# Patient Record
Sex: MALE | Race: BLACK OR AFRICAN AMERICAN | NOT HISPANIC OR LATINO | URBAN - METROPOLITAN AREA
[De-identification: names, ages, dates, MRNs, and addresses within clinical notes are randomized per-mention and may not be internally consistent; named-entity substitution may affect disease eponyms.]

---

## 2017-06-24 ENCOUNTER — EMERGENCY (EMERGENCY)
Facility: HOSPITAL | Age: 82
LOS: 1 days | Discharge: HOME | End: 2017-06-24
Attending: EMERGENCY MEDICINE
Payer: MEDICARE

## 2017-06-24 VITALS
DIASTOLIC BLOOD PRESSURE: 67 MMHG | SYSTOLIC BLOOD PRESSURE: 174 MMHG | OXYGEN SATURATION: 97 % | TEMPERATURE: 99 F | HEIGHT: 67 IN | HEART RATE: 93 BPM | WEIGHT: 139.99 LBS | RESPIRATION RATE: 17 BRPM

## 2017-06-24 DIAGNOSIS — R10.9 UNSPECIFIED ABDOMINAL PAIN: ICD-10-CM

## 2017-06-24 DIAGNOSIS — M79.1 MYALGIA: ICD-10-CM

## 2017-06-24 DIAGNOSIS — E78.5 HYPERLIPIDEMIA, UNSPECIFIED: ICD-10-CM

## 2017-06-24 LAB
ACETONE SERPL-MCNC: NEGATIVE — SIGNIFICANT CHANGE UP
ALBUMIN SERPL ELPH-MCNC: 3.2 G/DL — LOW (ref 3.5–5)
ALP SERPL-CCNC: 97 U/L — SIGNIFICANT CHANGE UP (ref 40–120)
ALT FLD-CCNC: 27 U/L DA — SIGNIFICANT CHANGE UP (ref 10–60)
ANION GAP SERPL CALC-SCNC: 8 MMOL/L — SIGNIFICANT CHANGE UP (ref 5–17)
APPEARANCE UR: CLEAR — SIGNIFICANT CHANGE UP
APTT BLD: 31.2 SEC — SIGNIFICANT CHANGE UP (ref 27.5–37.4)
AST SERPL-CCNC: 28 U/L — SIGNIFICANT CHANGE UP (ref 10–40)
BACTERIA # UR AUTO: ABNORMAL /HPF
BASOPHILS # BLD AUTO: 0.2 K/UL — SIGNIFICANT CHANGE UP (ref 0–0.2)
BASOPHILS NFR BLD AUTO: 1.8 % — SIGNIFICANT CHANGE UP (ref 0–2)
BILIRUB SERPL-MCNC: 0.6 MG/DL — SIGNIFICANT CHANGE UP (ref 0.2–1.2)
BILIRUB UR-MCNC: NEGATIVE — SIGNIFICANT CHANGE UP
BUN SERPL-MCNC: 12 MG/DL — SIGNIFICANT CHANGE UP (ref 7–18)
CALCIUM SERPL-MCNC: 9.4 MG/DL — SIGNIFICANT CHANGE UP (ref 8.4–10.5)
CHLORIDE SERPL-SCNC: 109 MMOL/L — HIGH (ref 96–108)
CK MB BLD-MCNC: 0.2 % — SIGNIFICANT CHANGE UP (ref 0–3.5)
CK MB CFR SERPL CALC: 1.2 NG/ML — SIGNIFICANT CHANGE UP (ref 0–3.6)
CK SERPL-CCNC: 616 U/L — HIGH (ref 35–232)
CO2 SERPL-SCNC: 25 MMOL/L — SIGNIFICANT CHANGE UP (ref 22–31)
COLOR SPEC: YELLOW — SIGNIFICANT CHANGE UP
COMMENT - URINE: SIGNIFICANT CHANGE UP
CREAT SERPL-MCNC: 0.88 MG/DL — SIGNIFICANT CHANGE UP (ref 0.5–1.3)
DIFF PNL FLD: ABNORMAL
EOSINOPHIL # BLD AUTO: 0.7 K/UL — HIGH (ref 0–0.5)
EOSINOPHIL NFR BLD AUTO: 6 % — SIGNIFICANT CHANGE UP (ref 0–6)
EPI CELLS # UR: ABNORMAL (ref 0–10)
GLUCOSE SERPL-MCNC: 102 MG/DL — HIGH (ref 70–99)
GLUCOSE UR QL: NEGATIVE — SIGNIFICANT CHANGE UP
HCT VFR BLD CALC: 45.7 % — SIGNIFICANT CHANGE UP (ref 39–50)
HGB BLD-MCNC: 14.6 G/DL — SIGNIFICANT CHANGE UP (ref 13–17)
INR BLD: 1.03 RATIO — SIGNIFICANT CHANGE UP (ref 0.88–1.16)
KETONES UR-MCNC: NEGATIVE — SIGNIFICANT CHANGE UP
LEUKOCYTE ESTERASE UR-ACNC: ABNORMAL
LIDOCAIN IGE QN: 123 U/L — SIGNIFICANT CHANGE UP (ref 73–393)
LYMPHOCYTES # BLD AUTO: 29.8 % — SIGNIFICANT CHANGE UP (ref 13–44)
LYMPHOCYTES # BLD AUTO: 3.3 K/UL — SIGNIFICANT CHANGE UP (ref 1–3.3)
MAGNESIUM SERPL-MCNC: 2.2 MG/DL — SIGNIFICANT CHANGE UP (ref 1.6–2.6)
MCHC RBC-ENTMCNC: 30.3 PG — SIGNIFICANT CHANGE UP (ref 27–34)
MCHC RBC-ENTMCNC: 31.8 GM/DL — LOW (ref 32–36)
MCV RBC AUTO: 95 FL — SIGNIFICANT CHANGE UP (ref 80–100)
MONOCYTES # BLD AUTO: 1 K/UL — HIGH (ref 0–0.9)
MONOCYTES NFR BLD AUTO: 9.3 % — SIGNIFICANT CHANGE UP (ref 2–14)
NEUTROPHILS # BLD AUTO: 5.8 K/UL — SIGNIFICANT CHANGE UP (ref 1.8–7.4)
NEUTROPHILS NFR BLD AUTO: 53.2 % — SIGNIFICANT CHANGE UP (ref 43–77)
NITRITE UR-MCNC: NEGATIVE — SIGNIFICANT CHANGE UP
PH UR: 6 — SIGNIFICANT CHANGE UP (ref 5–8)
PLATELET # BLD AUTO: 303 K/UL — SIGNIFICANT CHANGE UP (ref 150–400)
POTASSIUM SERPL-MCNC: 4.1 MMOL/L — SIGNIFICANT CHANGE UP (ref 3.5–5.3)
POTASSIUM SERPL-SCNC: 4.1 MMOL/L — SIGNIFICANT CHANGE UP (ref 3.5–5.3)
PROT SERPL-MCNC: 7.7 G/DL — SIGNIFICANT CHANGE UP (ref 6–8.3)
PROT UR-MCNC: 30 MG/DL
PROTHROM AB SERPL-ACNC: 11.2 SEC — SIGNIFICANT CHANGE UP (ref 9.8–12.7)
RBC # BLD: 4.81 M/UL — SIGNIFICANT CHANGE UP (ref 4.2–5.8)
RBC # FLD: 11.9 % — SIGNIFICANT CHANGE UP (ref 10.3–14.5)
RBC CASTS # UR COMP ASSIST: SIGNIFICANT CHANGE UP /HPF (ref 0–2)
SODIUM SERPL-SCNC: 142 MMOL/L — SIGNIFICANT CHANGE UP (ref 135–145)
SP GR SPEC: 1.02 — SIGNIFICANT CHANGE UP (ref 1.01–1.02)
TROPONIN I SERPL-MCNC: <0.015 NG/ML — SIGNIFICANT CHANGE UP (ref 0–0.04)
UROBILINOGEN FLD QL: 1
WBC # BLD: 11 K/UL — HIGH (ref 3.8–10.5)
WBC # FLD AUTO: 11 K/UL — HIGH (ref 3.8–10.5)
WBC UR QL: SIGNIFICANT CHANGE UP /HPF (ref 0–5)

## 2017-06-24 PROCEDURE — 85610 PROTHROMBIN TIME: CPT

## 2017-06-24 PROCEDURE — 87086 URINE CULTURE/COLONY COUNT: CPT

## 2017-06-24 PROCEDURE — 96374 THER/PROPH/DIAG INJ IV PUSH: CPT

## 2017-06-24 PROCEDURE — 80053 COMPREHEN METABOLIC PANEL: CPT

## 2017-06-24 PROCEDURE — 83690 ASSAY OF LIPASE: CPT

## 2017-06-24 PROCEDURE — 99284 EMERGENCY DEPT VISIT MOD MDM: CPT | Mod: 25

## 2017-06-24 PROCEDURE — 82553 CREATINE MB FRACTION: CPT

## 2017-06-24 PROCEDURE — 82550 ASSAY OF CK (CPK): CPT

## 2017-06-24 PROCEDURE — 81001 URINALYSIS AUTO W/SCOPE: CPT

## 2017-06-24 PROCEDURE — 71010: CPT | Mod: 26

## 2017-06-24 PROCEDURE — 99284 EMERGENCY DEPT VISIT MOD MDM: CPT

## 2017-06-24 PROCEDURE — 84484 ASSAY OF TROPONIN QUANT: CPT

## 2017-06-24 PROCEDURE — 85027 COMPLETE CBC AUTOMATED: CPT

## 2017-06-24 PROCEDURE — 96375 TX/PRO/DX INJ NEW DRUG ADDON: CPT

## 2017-06-24 PROCEDURE — 36415 COLL VENOUS BLD VENIPUNCTURE: CPT

## 2017-06-24 PROCEDURE — 85730 THROMBOPLASTIN TIME PARTIAL: CPT

## 2017-06-24 PROCEDURE — 83735 ASSAY OF MAGNESIUM: CPT

## 2017-06-24 PROCEDURE — 82009 KETONE BODYS QUAL: CPT

## 2017-06-24 PROCEDURE — 71045 X-RAY EXAM CHEST 1 VIEW: CPT

## 2017-06-24 RX ORDER — KETOROLAC TROMETHAMINE 30 MG/ML
30 SYRINGE (ML) INJECTION ONCE
Qty: 0 | Refills: 0 | Status: DISCONTINUED | OUTPATIENT
Start: 2017-06-24 | End: 2017-06-24

## 2017-06-24 RX ORDER — SODIUM CHLORIDE 9 MG/ML
3 INJECTION INTRAMUSCULAR; INTRAVENOUS; SUBCUTANEOUS ONCE
Qty: 0 | Refills: 0 | Status: COMPLETED | OUTPATIENT
Start: 2017-06-24 | End: 2017-06-24

## 2017-06-24 RX ORDER — ONDANSETRON 8 MG/1
4 TABLET, FILM COATED ORAL ONCE
Qty: 0 | Refills: 0 | Status: COMPLETED | OUTPATIENT
Start: 2017-06-24 | End: 2017-06-24

## 2017-06-24 RX ORDER — FAMOTIDINE 10 MG/ML
20 INJECTION INTRAVENOUS ONCE
Qty: 0 | Refills: 0 | Status: COMPLETED | OUTPATIENT
Start: 2017-06-24 | End: 2017-06-24

## 2017-06-24 RX ADMIN — FAMOTIDINE 20 MILLIGRAM(S): 10 INJECTION INTRAVENOUS at 10:53

## 2017-06-24 RX ADMIN — ONDANSETRON 4 MILLIGRAM(S): 8 TABLET, FILM COATED ORAL at 10:53

## 2017-06-24 RX ADMIN — Medication 30 MILLIGRAM(S): at 11:53

## 2017-06-24 RX ADMIN — SODIUM CHLORIDE 3 MILLILITER(S): 9 INJECTION INTRAMUSCULAR; INTRAVENOUS; SUBCUTANEOUS at 10:52

## 2017-06-24 NOTE — ED PROVIDER NOTE - OBJECTIVE STATEMENT
80 y/o M pt w/ PMHx of HLD and prostate CA presents to the ED for R sided abd pain. Pt describes the pain as constant and worsens with movement. Pt says there is no pain at rest. Pt denies nausea, vomiting, diarrhea, dysuria, falling, or any other complaints. NKDA.

## 2017-06-24 NOTE — ED PROVIDER NOTE - MEDICAL DECISION MAKING DETAILS
80 y/o M pt presents to the ED for R sided lateral abdominal pain. Mostly muscular origin. Due to age, will get labs, CXR to r/o PNA, and reassess.

## 2017-06-25 LAB
CULTURE RESULTS: NO GROWTH — SIGNIFICANT CHANGE UP
SPECIMEN SOURCE: SIGNIFICANT CHANGE UP

## 2021-09-05 ENCOUNTER — INPATIENT (INPATIENT)
Facility: HOSPITAL | Age: 86
LOS: 3 days | Discharge: HOME | DRG: 64 | End: 2021-09-09
Attending: HOSPITALIST | Admitting: HOSPITALIST
Payer: MEDICARE

## 2021-09-05 VITALS
DIASTOLIC BLOOD PRESSURE: 74 MMHG | WEIGHT: 134.92 LBS | HEIGHT: 67 IN | RESPIRATION RATE: 17 BRPM | TEMPERATURE: 98 F | OXYGEN SATURATION: 97 % | SYSTOLIC BLOOD PRESSURE: 151 MMHG | HEART RATE: 68 BPM

## 2021-09-05 LAB
ALBUMIN SERPL ELPH-MCNC: 3.8 G/DL — SIGNIFICANT CHANGE UP (ref 3.5–5)
ALP SERPL-CCNC: 90 U/L — SIGNIFICANT CHANGE UP (ref 40–120)
ALT FLD-CCNC: 24 U/L DA — SIGNIFICANT CHANGE UP (ref 10–60)
ANION GAP SERPL CALC-SCNC: 5 MMOL/L — SIGNIFICANT CHANGE UP (ref 5–17)
AST SERPL-CCNC: 22 U/L — SIGNIFICANT CHANGE UP (ref 10–40)
BASOPHILS # BLD AUTO: 0.08 K/UL — SIGNIFICANT CHANGE UP (ref 0–0.2)
BASOPHILS NFR BLD AUTO: 0.9 % — SIGNIFICANT CHANGE UP (ref 0–2)
BILIRUB SERPL-MCNC: 0.4 MG/DL — SIGNIFICANT CHANGE UP (ref 0.2–1.2)
BUN SERPL-MCNC: 9 MG/DL — SIGNIFICANT CHANGE UP (ref 7–18)
CALCIUM SERPL-MCNC: 9.4 MG/DL — SIGNIFICANT CHANGE UP (ref 8.4–10.5)
CHLORIDE SERPL-SCNC: 105 MMOL/L — SIGNIFICANT CHANGE UP (ref 96–108)
CO2 SERPL-SCNC: 28 MMOL/L — SIGNIFICANT CHANGE UP (ref 22–31)
CREAT SERPL-MCNC: 0.65 MG/DL — SIGNIFICANT CHANGE UP (ref 0.5–1.3)
EOSINOPHIL # BLD AUTO: 0.36 K/UL — SIGNIFICANT CHANGE UP (ref 0–0.5)
EOSINOPHIL NFR BLD AUTO: 3.9 % — SIGNIFICANT CHANGE UP (ref 0–6)
GLUCOSE SERPL-MCNC: 93 MG/DL — SIGNIFICANT CHANGE UP (ref 70–99)
HCT VFR BLD CALC: 42.7 % — SIGNIFICANT CHANGE UP (ref 39–50)
HGB BLD-MCNC: 13.8 G/DL — SIGNIFICANT CHANGE UP (ref 13–17)
IMM GRANULOCYTES NFR BLD AUTO: 0.3 % — SIGNIFICANT CHANGE UP (ref 0–1.5)
LYMPHOCYTES # BLD AUTO: 3.22 K/UL — SIGNIFICANT CHANGE UP (ref 1–3.3)
LYMPHOCYTES # BLD AUTO: 35.1 % — SIGNIFICANT CHANGE UP (ref 13–44)
MCHC RBC-ENTMCNC: 29.8 PG — SIGNIFICANT CHANGE UP (ref 27–34)
MCHC RBC-ENTMCNC: 32.3 GM/DL — SIGNIFICANT CHANGE UP (ref 32–36)
MCV RBC AUTO: 92.2 FL — SIGNIFICANT CHANGE UP (ref 80–100)
MONOCYTES # BLD AUTO: 0.85 K/UL — SIGNIFICANT CHANGE UP (ref 0–0.9)
MONOCYTES NFR BLD AUTO: 9.3 % — SIGNIFICANT CHANGE UP (ref 2–14)
NEUTROPHILS # BLD AUTO: 4.63 K/UL — SIGNIFICANT CHANGE UP (ref 1.8–7.4)
NEUTROPHILS NFR BLD AUTO: 50.5 % — SIGNIFICANT CHANGE UP (ref 43–77)
NRBC # BLD: 0 /100 WBCS — SIGNIFICANT CHANGE UP (ref 0–0)
PLATELET # BLD AUTO: 340 K/UL — SIGNIFICANT CHANGE UP (ref 150–400)
POTASSIUM SERPL-MCNC: 4.2 MMOL/L — SIGNIFICANT CHANGE UP (ref 3.5–5.3)
POTASSIUM SERPL-SCNC: 4.2 MMOL/L — SIGNIFICANT CHANGE UP (ref 3.5–5.3)
PROT SERPL-MCNC: 7.3 G/DL — SIGNIFICANT CHANGE UP (ref 6–8.3)
RBC # BLD: 4.63 M/UL — SIGNIFICANT CHANGE UP (ref 4.2–5.8)
RBC # FLD: 12.8 % — SIGNIFICANT CHANGE UP (ref 10.3–14.5)
SODIUM SERPL-SCNC: 138 MMOL/L — SIGNIFICANT CHANGE UP (ref 135–145)
TROPONIN I SERPL-MCNC: <0.015 NG/ML — SIGNIFICANT CHANGE UP (ref 0–0.04)
WBC # BLD: 9.17 K/UL — SIGNIFICANT CHANGE UP (ref 3.8–10.5)
WBC # FLD AUTO: 9.17 K/UL — SIGNIFICANT CHANGE UP (ref 3.8–10.5)

## 2021-09-05 PROCEDURE — 70450 CT HEAD/BRAIN W/O DYE: CPT | Mod: 26,MA

## 2021-09-05 PROCEDURE — 73030 X-RAY EXAM OF SHOULDER: CPT | Mod: 26,LT

## 2021-09-05 PROCEDURE — 99285 EMERGENCY DEPT VISIT HI MDM: CPT

## 2021-09-05 NOTE — ED ADULT NURSE NOTE - CHIEF COMPLAINT QUOTE
Bought in by daughter (Lv Potter [phone]) pt landed at 6pm at [facility] from a flight from [facility]. He was Diagnosed with "mini strokes" in [facility] because he has been decorticating 3 days ago.  In triage, pt is aaox4, ambulatory and complaining of left shoulder pain.  He denies recollection of the events his daughter speaks of.  He voiced left shoulder and left hip pain.  He has h/o HTN only, as per daughter

## 2021-09-05 NOTE — ED ADULT NURSE NOTE - NSIMPLEMENTINTERV_GEN_ALL_ED
Implemented All Universal Safety Interventions:  [place] to call system. Call bell, personal items and telephone within reach. Instruct patient to call for assistance. Room bathroom lighting operational. Non-slip footwear when patient is off stretcher. Physically safe environment: no spills, clutter or unnecessary equipment. Stretcher in lowest position, wheels locked, appropriate side rails in place.

## 2021-09-05 NOTE — ED ADULT NURSE NOTE - OBJECTIVE STATEMENT
as per daughter came back from [facility],4 days ago pt diagnosed with mini stroke with complaing of left shoulder  and hip pain

## 2021-09-06 DIAGNOSIS — E78.5 HYPERLIPIDEMIA, UNSPECIFIED: ICD-10-CM

## 2021-09-06 DIAGNOSIS — Z29.9 ENCOUNTER FOR PROPHYLACTIC MEASURES, UNSPECIFIED: ICD-10-CM

## 2021-09-06 DIAGNOSIS — I63.9 CEREBRAL INFARCTION, UNSPECIFIED: ICD-10-CM

## 2021-09-06 DIAGNOSIS — I63.81 OTHER CEREBRAL INFARCTION DUE TO OCCLUSION OR STENOSIS OF SMALL ARTERY: ICD-10-CM

## 2021-09-06 DIAGNOSIS — I10 ESSENTIAL (PRIMARY) HYPERTENSION: ICD-10-CM

## 2021-09-06 LAB
24R-OH-CALCIDIOL SERPL-MCNC: 43.1 NG/ML — SIGNIFICANT CHANGE UP (ref 30–80)
A1C WITH ESTIMATED AVERAGE GLUCOSE RESULT: 5.8 % — HIGH (ref 4–5.6)
ALBUMIN SERPL ELPH-MCNC: 3.5 G/DL — SIGNIFICANT CHANGE UP (ref 3.5–5)
ALP SERPL-CCNC: 86 U/L — SIGNIFICANT CHANGE UP (ref 40–120)
ALT FLD-CCNC: 24 U/L DA — SIGNIFICANT CHANGE UP (ref 10–60)
ANION GAP SERPL CALC-SCNC: 5 MMOL/L — SIGNIFICANT CHANGE UP (ref 5–17)
AST SERPL-CCNC: 22 U/L — SIGNIFICANT CHANGE UP (ref 10–40)
BILIRUB SERPL-MCNC: 0.6 MG/DL — SIGNIFICANT CHANGE UP (ref 0.2–1.2)
BUN SERPL-MCNC: 8 MG/DL — SIGNIFICANT CHANGE UP (ref 7–18)
CALCIUM SERPL-MCNC: 9.2 MG/DL — SIGNIFICANT CHANGE UP (ref 8.4–10.5)
CHLORIDE SERPL-SCNC: 106 MMOL/L — SIGNIFICANT CHANGE UP (ref 96–108)
CHOLEST SERPL-MCNC: 159 MG/DL — SIGNIFICANT CHANGE UP
CO2 SERPL-SCNC: 27 MMOL/L — SIGNIFICANT CHANGE UP (ref 22–31)
CREAT SERPL-MCNC: 0.64 MG/DL — SIGNIFICANT CHANGE UP (ref 0.5–1.3)
ESTIMATED AVERAGE GLUCOSE: 120 MG/DL — HIGH (ref 68–114)
GLUCOSE SERPL-MCNC: 84 MG/DL — SIGNIFICANT CHANGE UP (ref 70–99)
HCT VFR BLD CALC: 42.9 % — SIGNIFICANT CHANGE UP (ref 39–50)
HDLC SERPL-MCNC: 52 MG/DL — SIGNIFICANT CHANGE UP
HGB BLD-MCNC: 14.1 G/DL — SIGNIFICANT CHANGE UP (ref 13–17)
INR BLD: 1.02 RATIO — SIGNIFICANT CHANGE UP (ref 0.88–1.16)
LIPID PNL WITH DIRECT LDL SERPL: 91 MG/DL — SIGNIFICANT CHANGE UP
MAGNESIUM SERPL-MCNC: 2.3 MG/DL — SIGNIFICANT CHANGE UP (ref 1.6–2.6)
MCHC RBC-ENTMCNC: 30 PG — SIGNIFICANT CHANGE UP (ref 27–34)
MCHC RBC-ENTMCNC: 32.9 GM/DL — SIGNIFICANT CHANGE UP (ref 32–36)
MCV RBC AUTO: 91.3 FL — SIGNIFICANT CHANGE UP (ref 80–100)
NON HDL CHOLESTEROL: 107 MG/DL — SIGNIFICANT CHANGE UP
NRBC # BLD: 0 /100 WBCS — SIGNIFICANT CHANGE UP (ref 0–0)
PHOSPHATE SERPL-MCNC: 2.9 MG/DL — SIGNIFICANT CHANGE UP (ref 2.5–4.5)
PLATELET # BLD AUTO: 326 K/UL — SIGNIFICANT CHANGE UP (ref 150–400)
POTASSIUM SERPL-MCNC: 3.9 MMOL/L — SIGNIFICANT CHANGE UP (ref 3.5–5.3)
POTASSIUM SERPL-SCNC: 3.9 MMOL/L — SIGNIFICANT CHANGE UP (ref 3.5–5.3)
PROT SERPL-MCNC: 7.1 G/DL — SIGNIFICANT CHANGE UP (ref 6–8.3)
PROTHROM AB SERPL-ACNC: 12.1 SEC — SIGNIFICANT CHANGE UP (ref 10.6–13.6)
RBC # BLD: 4.7 M/UL — SIGNIFICANT CHANGE UP (ref 4.2–5.8)
RBC # FLD: 12.9 % — SIGNIFICANT CHANGE UP (ref 10.3–14.5)
SARS-COV-2 RNA SPEC QL NAA+PROBE: SIGNIFICANT CHANGE UP
SODIUM SERPL-SCNC: 138 MMOL/L — SIGNIFICANT CHANGE UP (ref 135–145)
TRIGL SERPL-MCNC: 79 MG/DL — SIGNIFICANT CHANGE UP
TROPONIN I SERPL-MCNC: <0.015 NG/ML — SIGNIFICANT CHANGE UP (ref 0–0.04)
WBC # BLD: 7.4 K/UL — SIGNIFICANT CHANGE UP (ref 3.8–10.5)
WBC # FLD AUTO: 7.4 K/UL — SIGNIFICANT CHANGE UP (ref 3.8–10.5)

## 2021-09-06 PROCEDURE — 99223 1ST HOSP IP/OBS HIGH 75: CPT

## 2021-09-06 PROCEDURE — 99223 1ST HOSP IP/OBS HIGH 75: CPT | Mod: GC

## 2021-09-06 PROCEDURE — 71045 X-RAY EXAM CHEST 1 VIEW: CPT | Mod: 26

## 2021-09-06 RX ORDER — ATORVASTATIN CALCIUM 80 MG/1
40 TABLET, FILM COATED ORAL AT BEDTIME
Refills: 0 | Status: DISCONTINUED | OUTPATIENT
Start: 2021-09-06 | End: 2021-09-09

## 2021-09-06 RX ORDER — ENOXAPARIN SODIUM 100 MG/ML
40 INJECTION SUBCUTANEOUS DAILY
Refills: 0 | Status: DISCONTINUED | OUTPATIENT
Start: 2021-09-06 | End: 2021-09-06

## 2021-09-06 RX ORDER — INFLUENZA VIRUS VACCINE 15; 15; 15; 15 UG/.5ML; UG/.5ML; UG/.5ML; UG/.5ML
0.5 SUSPENSION INTRAMUSCULAR ONCE
Refills: 0 | Status: DISCONTINUED | OUTPATIENT
Start: 2021-09-06 | End: 2021-09-09

## 2021-09-06 RX ORDER — ASPIRIN/CALCIUM CARB/MAGNESIUM 324 MG
81 TABLET ORAL DAILY
Refills: 0 | Status: DISCONTINUED | OUTPATIENT
Start: 2021-09-06 | End: 2021-09-06

## 2021-09-06 RX ADMIN — ATORVASTATIN CALCIUM 40 MILLIGRAM(S): 80 TABLET, FILM COATED ORAL at 21:44

## 2021-09-06 NOTE — ED PROVIDER NOTE - CLINICAL SUMMARY MEDICAL DECISION MAKING FREE TEXT BOX
87 y/o man, h/o HTN, noncompliant with medication, just flew in from [facility] and presents here accompanied by daughter, reporting that about 1 week ago he had weakness and numbness to LLE, difficulty walking, which lasted about 1-2 days and resolved spontaneously--CT head, labs, EKG, admission.

## 2021-09-06 NOTE — H&P ADULT - NSHPSOCIALHISTORY_GEN_ALL_CORE
Active smoker, Smokes around 10 cigs per day,   Drinks Alcohol occasionally, last drink last Friday (1 week ago), used to be a heavy drinker   No Drugs

## 2021-09-06 NOTE — PATIENT PROFILE ADULT - NSPROIMPLANTSMEDDEV_GEN_A_NUR
Patient would like communication of their results via:    Home Phone: [phone] (home)  Okay to leave a message containing results? Yes     Health Maintenance Due   Topic Date Due   • Colorectal Cancer Screening-Colonoscopy  02/03/2004   • Shingles Vaccine (1 of 2) 02/03/2004   • Hepatitis C Screening  02/03/2005   • Breast Cancer Screening  02/03/2009   • Osteoporosis Screening  02/03/2019   • Medicare Wellness 65+  02/03/2019   • Pneumococcal Vaccine 65+ (1 of 2 - PCV13) 02/03/2019                              None

## 2021-09-06 NOTE — H&P ADULT - NSHPPHYSICALEXAM_GEN_ALL_CORE
PHYSICAL EXAM:  GENERAL: NAD, speaks in full sentences, no signs of respiratory distress  HEAD:  Atraumatic, Normocephalic  EYES: EOMI, PERRLA, conjunctiva and sclera clear  NECK: Supple, No JVD  CHEST/LUNG: Clear to auscultation bilaterally; No wheeze; No crackles; No accessory muscles used  HEART: Regular rate and rhythm; No murmurs;   ABDOMEN: Soft, Nontender, Nondistended; Bowel sounds present; No guarding  EXTREMITIES:  2+ Peripheral Pulses, No cyanosis or edema  PSYCH: AAOx3  NEUROLOGY: Power 5/5 in B/L upper and lower extremity, CN intact II-XII, Sensations intact, plantars down  SKIN: No rashes or lesions

## 2021-09-06 NOTE — CHART NOTE - NSCHARTNOTEFT_GEN_A_CORE
Spoke to pt's daughter Genadel Labidou at bedside, updated on clinical status treatment plan/options all questions answered   Home medication list reviewed with pt's daughter, pt is only on Aspirin 81mg daily at home, admission med rec updated

## 2021-09-06 NOTE — H&P ADULT - HISTORY OF PRESENT ILLNESS
87 y/o man Creol speaking male, with h/o HTN, noncompliant with medication, just flew in from [facility] and presents here accompanied by daughter, reporting that about 1 week ago he had weakness and numbness to left side, difficulty walking, which lasted about 1-2 days and resolved spontaneously.  He was seen at a medical clinic in [facility] and was diagnosed to have a possible Stroke or TIA, however ,no imaging or treatment was performed. According to patient's daughter, he is back to baseline, no more weakness is present, however, complains of slow gait and needs assistance to walk.   Patient denies speech, visual, or mental status change, spasms, weakness, chest pain, headaches, diarrhea, constipation.,     FULL CODE  ED course:  Vital Signs Last 24 Hrs  T(C): 36.9 (06 Sep 2021 00:32), Max: 36.9 (06 Sep 2021 00:32)  T(F): 98.5 (06 Sep 2021 00:32), Max: 98.5 (06 Sep 2021 00:32)  HR: 72 (06 Sep 2021 00:32) (68 - 72)  BP: 152/69 (06 Sep 2021 00:32) (151/74 - 152/69)  RR: 18 (06 Sep 2021 00:32) (17 - 18)  SpO2: 97% (06 Sep 2021 00:32) (97% - 97%)  EKG: NSR with non specific T wave changes

## 2021-09-06 NOTE — H&P ADULT - PROBLEM SELECTOR PLAN 4
RISK                                                          Points  [] Previous VTE                                           3  [] Thrombophilia                                        2  [] Lower limb paralysis                              2   [] Current Cancer                                       2   [x] Immobilization > 24 hrs                        1  [] ICU/CCU stay > 24 hours                       1  [x] Age > 60                                                   1    Will hold chemical prophylaxis as pt have micro-hemorrhages on CT scan, will advance if cleared by neuro   SCD boots for now

## 2021-09-06 NOTE — ED PROVIDER NOTE - OBJECTIVE STATEMENT
87 y/o man, h/o HTN, noncompliant with medication, just flew in from [facility] and presents here accompanied by daughter, reporting that about 1 week ago he had weakness and numbness to LLE, difficulty walking, which lasted about 1-2 days and resolved spontaneously.  He was seen at a medical clinic in [facility] but they say no imaging or treatment was performed.  He has not had any symptoms since then.  He also felt brief spasms of LUE  several days ago, which have resolved.  No speech or mental status change.  Does not take any anticoagulation or antiplatelets.

## 2021-09-06 NOTE — H&P ADULT - ATTENDING COMMENTS
Vital Signs Last 24 Hrs  T(C): 36.9 (06 Sep 2021 00:32), Max: 36.9 (06 Sep 2021 00:32)  T(F): 98.5 (06 Sep 2021 00:32), Max: 98.5 (06 Sep 2021 00:32)  HR: 72 (06 Sep 2021 00:32) (68 - 72)  BP: 152/69 (06 Sep 2021 00:32) (151/74 - 152/69)  BP(mean): --  RR: 18 (06 Sep 2021 00:32) (17 - 18)  SpO2: 97% (06 Sep 2021 00:32) (97% - 97%)    Physical exam as above    Labs and imaging studies noted    Assessment and plan: Patient is a 87 y/o man Creol speaking male, from home, independent at baseline, ambulatory with PMH HTN, noncompliant with medications, just flew in from [facility] and presents here accompanied by daughter, reporting that about 1 week ago he had weakness and numbness to left side, difficulty walking, which lasted about 1-2 days and resolved spontaneously.  He was seen at a medical clinic in [facility] and was diagnosed to have a possible Stroke or TIA, however ,no imaging or treatment was performed. According to patient's daughter, he is back to baseline, no more weakness is present, however, complains of slow gait and needs assistance to walk.   Patient denies speech, visual, or mental status change, spasms, weakness, chest pain, headaches, diarrhea, constipation.,       Vital Signs Last 24 Hrs  T(C): 36.9 (06 Sep 2021 00:32), Max: 36.9 (06 Sep 2021 00:32)  T(F): 98.5 (06 Sep 2021 00:32), Max: 98.5 (06 Sep 2021 00:32)  HR: 72 (06 Sep 2021 00:32) (68 - 72)  BP: 152/69 (06 Sep 2021 00:32) (151/74 - 152/69)  BP(mean): --  RR: 18 (06 Sep 2021 00:32) (17 - 18)  SpO2: 97% (06 Sep 2021 00:32) (97% - 97%)    Physical exam as above    Labs and imaging studies noted    Assessment and plan: Patient is a 85 yo male Creol speaking male, from home, independent at baseline, ambulatory at baseline with PMH of HTN, noncompliant with medications, presented with c/o difficulty walking, that started about 1 week ago when patient was in [facility]. Patient developed left lower extremity weakness 1 week ago, went to clinic in [facility], reportedly no imagining was done, not started on any medications. Patient reports that weakness improved spontaneously but still has some difficulty walking and requiring assistance with walking. Daughter reports some slurring on initial presentation but that has resolved. Denies any visual changes, lightheadedness, chest pain. patient not taking Aspirin at home for awhile .    In ED patient AAO x 3, /74, HR 68.  CT head w/o contrast reported as "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 Mild mass effect upon the frontal horn of the right lateral ventricle."  EKG NSR    Vital Signs Last 24 Hrs  T(C): 36.9 (06 Sep 2021 00:32), Max: 36.9 (06 Sep 2021 00:32)  T(F): 98.5 (06 Sep 2021 00:32), Max: 98.5 (06 Sep 2021 00:32)  HR: 72 (06 Sep 2021 00:32) (68 - 72)  BP: 152/69 (06 Sep 2021 00:32) (151/74 - 152/69)  BP(mean): --  RR: 18 (06 Sep 2021 00:32) (17 - 18)  SpO2: 97% (06 Sep 2021 00:32) (97% - 97%)    Physical exam as above    Labs and imaging studies noted    Assessment and plan:  Patient is a 85 yo male Creol speaking male, from home, independent at baseline, ambulatory at baseline with PMH of HTN, noncompliant with medications, presented with c/o difficulty walking/ weakness started 1 week ago admitted for management and evaluation of ischemic CVA.    CVA: LLE weakness started 1 week ago, improved to  most extent except still having some residual difficulty walking.  h/o HTN, current tobacco use. NIHSS 0  Admit to telemetry unit  ED physician spoke to neuro surgery on call , no acute intervention  Given finding of petechial hemorrhage noted on CT, and symptoms going on for 1 week, will hold for Aspirin, for now.   Neuro consult, for MRI/MRA and decision for timing of starting antiplatelet agent.  will start on HI statin. check lipid panel, a1c  will hold off antihypertensives, repaet BP wnl, monitor vitals q6h  systolic murmur grade 2 noted on 2 nd left IC space; ECHO ordered  Fall precaution/ aspiration precautions, neuro checks  PT eval    HTN: as above  HLD: as above  Tobacco abuse: nicotine patch, SW consult  DVT ppx: SCD for now

## 2021-09-06 NOTE — ED PROVIDER NOTE - NEUROLOGICAL, MLM
Alert and oriented, no focal deficits, no motor or sensory deficits.  Normal speech/coordination, no facial droop.

## 2021-09-06 NOTE — CHART NOTE - NSCHARTNOTEFT_GEN_A_CORE
MEDICAL ATTENDING NOTE  Patient is a 86y old  Male who presents with a chief complaint of TIA/Stroke (06 Sep 2021 02:07)--Several episodes of weakness and numbness of the LUE, which have resolved.       HPI:  87 y/o man Creol speaking male, with h/o HTN, noncompliant with medication, just flew in from [facility] and presents here accompanied by daughter, reporting that about 1 week ago he had weakness and numbness to left side, difficulty walking, which lasted about 1-2 days and resolved spontaneously.  He was seen at a medical clinic in [facility] and was diagnosed to have a possible Stroke or TIA, however ,no imaging or treatment was performed. According to patient's daughter, he is back to baseline, no more weakness is present, however, complains of slow gait and needs assistance to walk.   Patient denies speech, visual, or mental status change, spasms, weakness, chest pain, headaches, diarrhea, constipation.,     FULL CODE      INTERVAL HPI/OVERNIGHT EVENTS: no new complaints    MEDICATIONS  (STANDING):  atorvastatin 40 milliGRAM(s) Oral at bedtime  influenza   Vaccine 0.5 milliLiter(s) IntraMuscular once    MEDICATIONS  (PRN):      __________________________________________________  REVIEW OF SYSTEMS:    CONSTITUTIONAL: No fever,   EYES: no acute visual disturbances  NECK: No pain or stiffness  RESPIRATORY: No cough; No shortness of breath  CARDIOVASCULAR: No chest pain, no palpitations  GASTROINTESTINAL: No pain. No nausea or vomiting; No diarrhea   NEUROLOGICAL: No headache or numbness, no tremors  MUSCULOSKELETAL: No joint pain, no muscle pain  GENITOURINARY: no dysuria, no frequency, no hesitancy  PSYCHIATRY: no depression , no anxiety  ALL OTHER  ROS negative        Vital Signs Last 24 Hrs  T(C): 36.4 (06 Sep 2021 15:44), Max: 36.9 (06 Sep 2021 00:32)  T(F): 97.5 (06 Sep 2021 15:44), Max: 98.5 (06 Sep 2021 00:32)  HR: 66 (06 Sep 2021 15:44) (66 - 82)  BP: 101/65 (06 Sep 2021 15:44) (101/65 - 152/69)  BP(mean): --  RR: 17 (06 Sep 2021 15:44) (16 - 18)  SpO2: 96% (06 Sep 2021 15:44) (95% - 100%)    ________________________________________________  PHYSICAL EXAM:  GENERAL: NAD  HEENT: Normocephalic;  conjunctivae and sclerae clear; moist mucous membranes;   NECK : supple  CHEST/LUNG: Clear to auscultation bilaterally with good air entry   HEART: S1 S2  regular; no murmurs, gallops or rubs  ABDOMEN: Soft, Nontender, Nondistended; Bowel sounds present  EXTREMITIES: no cyanosis; no edema; no calf tenderness  SKIN: warm and dry; no rash  NERVOUS SYSTEM:  Awake and alert; Oriented  to place, person and time ; no new deficits    _________________________________________________  LABS:                        14.1   7.40  )-----------( 326      ( 06 Sep 2021 05:35 )             42.9     09-06    138  |  106  |  8   ----------------------------<  84  3.9   |  27  |  0.64    Ca    9.2      06 Sep 2021 05:35  Phos  2.9     09-06  Mg     2.3     09-06    TPro  7.1  /  Alb  3.5  /  TBili  0.6  /  DBili  x   /  AST  22  /  ALT  24  /  AlkPhos  86  09-06    PT/INR - ( 06 Sep 2021 05:35 )   PT: 12.1 sec;   INR: 1.02 ratio      < from: Xray Chest 1 View AP/PA (09.06.21 @ 00:47) >    AP chest.    Normal heart size. Atherosclerotic aorta. Coronary artery stent. No consolidation or effusion.    IMPRESSION: Noactive disease    < end of copied text >    < from: CT Head No Cont (09.05.21 @ 23:26) >      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 Mild mass effect upon the frontal horn of the right lateral ventricle.    < end of copied text >      IMP:  TIA, intermittent left numbness and hemiparesis, which has resolved.  CT shows infarct, small petechiae, and mild mass effect.  CT is c/w infarct with hemorrhage, but clinical course is also          c/w mass effect.            HTN, BP is now controlled without medication            Evidence of ASCVD, s/p coronary stent.   Plan:  High dose statin.  Hold ASA.   Neurology consultation, Dr. Doty will see.           MRI to exclude mass, if Neurology agrees.        Daughter is present at the bedside.

## 2021-09-06 NOTE — ED PROVIDER NOTE - INTERNATIONAL TRAVEL COUNTRIES
Medical Week 3 Survey      Responses   [facility] [place], operated by [facility] patient discharged from?  [facility]   Does the patient have one of the following disease processes/diagnoses(primary or secondary)?  Other   Week 3 attempt successful?  No   Unsuccessful attempts  Attempt 2          Brandon White RN   [place]

## 2021-09-06 NOTE — CONSULT NOTE ADULT - TIME BILLING
I counseled the patient about the testing and treatment indicated for stroke workup and secondary stroke prevention.

## 2021-09-06 NOTE — CONSULT NOTE ADULT - SUBJECTIVE AND OBJECTIVE BOX
NEUROLOGY CONSULT NOTE    NAME:  AMELIA LUO      ASSESSMENT:        RECOMMENDATIONS:        NOTE TO BE COMPLETED - PLEASE REFER TO ABOVE ONLY AND IGNORE INFORMATION BELOW    *******************************      CHIEF COMPLAINT:  Patient is a 86y old  Male who presents with a chief complaint of TIA/Stroke (06 Sep 2021 02:07)      HPI:  87 y/o man Creol speaking male, with h/o HTN, noncompliant with medication, just flew in from [facility] and presents here accompanied by daughter, reporting that about 1 week ago he had weakness and numbness to left side, difficulty walking, which lasted about 1-2 days and resolved spontaneously.  He was seen at a medical clinic in [facility] and was diagnosed to have a possible Stroke or TIA, however ,no imaging or treatment was performed. According to patient's daughter, he is back to baseline, no more weakness is present, however, complains of slow gait and needs assistance to walk.   Patient denies speech, visual, or mental status change, spasms, weakness, chest pain, headaches, diarrhea, constipation.,     FULL CODE  ED course:  Vital Signs Last 24 Hrs  T(C): 36.9 (06 Sep 2021 00:32), Max: 36.9 (06 Sep 2021 00:32)  T(F): 98.5 (06 Sep 2021 00:32), Max: 98.5 (06 Sep 2021 00:32)  HR: 72 (06 Sep 2021 00:32) (68 - 72)  BP: 152/69 (06 Sep 2021 00:32) (151/74 - 152/69)  RR: 18 (06 Sep 2021 00:32) (17 - 18)  SpO2: 97% (06 Sep 2021 00:32) (97% - 97%)  EKG: NSR with non specific T wave changes    (06 Sep 2021 02:07)      NEURO HPI:      PAST MEDICAL & SURGICAL HISTORY:  Hypercholesterolemia    Hypertension    No significant past surgical history        MEDICATIONS:  atorvastatin 40 milliGRAM(s) Oral at bedtime  influenza   Vaccine 0.5 milliLiter(s) IntraMuscular once      ALLERGIES:  No Known Allergies      FAMILY HISTORY:        SOCIAL HISTORY:  Denies alcohol, tobacco, or illicit drug use    REVIEW OF SYSTEMS:  GENERAL: No fever, weight changes, fatigue  EYES: No eye pain or discharge  EAR/NOSE/MOUTH/THROAT: No sinus or throat pain; No difficulty hearing  NECK: No pain or stiffness  RESPIRATORY: No cough, wheezing, chills, or hemoptysis  CARDIOVASCULAR: No chest pain, palpitations, shortness of breath, or dyspnea on exertion  GASTROINTESTINAL: No abdominal pain, nausea, vomiting, hematemesis, diarrhea, or constipation  GENITOURINARY: No dysuria, frequency, hematuria, or incontinence  SKIN: No rashes or lesions  ENDOCRINE: No heat or cold intolerance  HEMATOLOGIC: No easy bruising or bleeding  PSYCHIATRIC: No depression, anxiety, or mood swings  MUSCULOSKELETAL: No joint pain or swelling  NEUROLOGICAL: As per HPI        OBJECTIVE:    Vital Signs Last 24 Hrs  T(C): 36.9 (06 Sep 2021 20:08), Max: 36.9 (06 Sep 2021 00:32)  T(F): 98.4 (06 Sep 2021 20:08), Max: 98.5 (06 Sep 2021 00:32)  HR: 66 (06 Sep 2021 20:08) (66 - 82)  BP: 108/64 (06 Sep 2021 20:08) (101/65 - 152/69)  BP(mean): --  RR: 17 (06 Sep 2021 20:08) (16 - 18)  SpO2: 98% (06 Sep 2021 20:08) (95% - 100%)    General Examination:  General: No acute distress  HEENT: Atraumatic, Normocephalic  Respiratory: CTA B/l.  No crackles, rhonchi, or wheezes.  Cardiovascular: RRR.  Normal S1 & S2.  Normal b/l radial and pedal pulses.    Neurological Examination:  General / Mental Status: AAO x 3.  No aphasia or dysarthria.  Naming and repetition intact.  Cranial Nerves: VFF x 4.  PERRL.  EOMI x 2, No nystagmus or diplopia.  B/l V1-V3 equal and intact to light touch and pinprick.  Symmetric facial movement and palate elevation.  B/l hearing equal to finger rub.  5/5 strength with b/l sternocleidomastoid & trapezius.  Midline tongue protrusion, with no atrophy or fasciculations.  Motor: Normal bulk & tone in all four extremities.  5/5 strength throughout all four extremities.  No downward drift, rigidity, spasticity, or tremors in any of the four extremities.  Sensory: Intact to light touch and pinprick in all four extremities.  Negative Romberg.  Reflex: 2+ and symmetric at b/l biceps, triceps, brachioradialis, patellae, and ankles.  Downgoing toes b/l.  Coordination: No dysmetria with b/l finger-to-nose and heel raise tests.  Symmetric rapid alternating movements b/l.  Gait: Normal, narrow-based gait.  No difficulty with tiptoe, heel, and tandem gaits.        LABORATORY VALUES:                        14.1   7.40  )-----------( 326      ( 06 Sep 2021 05:35 )             42.9       09-06    138  |  106  |  8   ----------------------------<  84  3.9   |  27  |  0.64    Ca    9.2      06 Sep 2021 05:35  Phos  2.9     09-06  Mg     2.3     09-06    TPro  7.1  /  Alb  3.5  /  TBili  0.6  /  DBili  x   /  AST  22  /  ALT  24  /  AlkPhos  86  09-06 09-06 Chol 159 LDL 96 HDL 52 Trig 79      A1C with Estimated Average Glucose (09.06.21 @ 10:23)   A1C with Estimated Average Glucose Result: 5.8%   Estimated Average Glucose: 120 mg/dL               NEUROIMAGING:          Please contact the Neurology consult service with any neurological questions.    Jose M Doty MD   of Neurology  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 Stroke workup  - CT Angiogram Head & Neck to evaluate for vascular abnormalities  - Transthoracic Echocardiogram  - Telemetry monitoring    2. Secondary stroke prevention  - Q4H Neurochecks & Vital signs  - Bedside swallow evaluation before administering any PO meds  - Hold antiplatelet agents for at least 7 days from presentation (14 days total) due to presence of petechial hemorrhage       - Thereafter, would start Aspirin 81mg daily (or aspirin 300mg [place] daily if NPO)  - Atorvastatin 40mg QHS (goal LDL < 70 mg/dl - no need to increase dose to 80mg due to LDL already below 100 mg/dl)  - Treat BP if over 140/90 (goal /80) - Out of time window for permissive hypertension  - Prediabetes management  - PT/OT  - DVT ppx: SCDs (avoid heparin or enoxaparin for at least 7 days from presentation)          *******************************      CHIEF COMPLAINT:  Patient is a 86y old  Male who presents with a chief complaint of TIA/Stroke (06 Sep 2021 02:07)      HPI:  87 y/o man Creole speaking male, with h/o HTN, noncompliant with medication, just flew in from [facility] and presents here accompanied by daughter, reporting that about 1 week ago he had weakness and numbness to left side, difficulty walking, which lasted about 1-2 days and resolved spontaneously.  He was seen at a medical clinic in [facility] and was diagnosed to have a possible Stroke or TIA, however, no imaging or treatment was performed. According to patient's daughter, he is back to baseline, no more weakness is present, however, complains of slow gait and needs assistance to walk. Patient denies speech, visual, or mental status change, spasms, weakness, chest pain, headaches, diarrhea, constipation.       NEURO HPI:  86 LHM with transient left-sided weakness and numbness one week ago, improved but still with some left leg weakness and difficulty walking.      PAST MEDICAL & SURGICAL HISTORY:  Hypercholesterolemia  Hypertension  No significant past surgical history      MEDICATIONS:  atorvastatin 40 milliGRAM(s) Oral at bedtime  influenza   Vaccine 0.5 milliLiter(s) IntraMuscular once      ALLERGIES:  No Known Allergies      FAMILY HISTORY:  No reported family history of stroke      SOCIAL HISTORY:  No reported alcohol, tobacco, or illicit drug use      REVIEW OF SYSTEMS:  GENERAL: No fever, weight changes, fatigue  EYES: No eye pain or discharge  EAR/NOSE/MOUTH/THROAT: No sinus or throat pain; No difficulty hearing  NECK: No pain or stiffness  RESPIRATORY: No cough, wheezing, chills, or hemoptysis  CARDIOVASCULAR: No chest pain, palpitations, shortness of breath, or dyspnea on exertion  GASTROINTESTINAL: No abdominal pain, nausea, vomiting, hematemesis, diarrhea, or constipation  GENITOURINARY: No dysuria, frequency, hematuria, or incontinence  SKIN: No rashes or lesions  ENDOCRINE: No reported heat or cold intolerance  HEMATOLOGIC: No easy bruising  PSYCHIATRIC: No reported depression, anxiety, or mood swings  MUSCULOSKELETAL: No joint pain or swelling  NEUROLOGICAL: As per HPI        OBJECTIVE:    Vital Signs Last 24 Hrs  T(C): 36.9 (06 Sep 2021 20:08), Max: 36.9 (06 Sep 2021 00:32)  T(F): 98.4 (06 Sep 2021 20:08), Max: 98.5 (06 Sep 2021 00:32)  HR: 66 (06 Sep 2021 20:08) (66 - 82)  BP: 108/64 (06 Sep 2021 20:08) (101/65 - 152/69)  RR: 17 (06 Sep 2021 20:08) (16 - 18)  SpO2: 98% (06 Sep 2021 20:08) (95% - 100%)    General Examination:  General: No acute distress  HEENT: Atraumatic, Normocephalic  Respiratory: CTA B/l.  No crackles, rhonchi, or wheezes.  Cardiovascular: RRR.  Normal S1 & S2.  Normal b/l radial and pedal pulses.    Neurological Examination:  General / Mental Status: AAO x 1 (only to time).  No aphasia or dysarthria.  Naming and repetition intact.  Cranial Nerves: VFF x 4.  PERRL.  EOMI x 2, No nystagmus or diplopia.  B/l V1-V3 equal and intact to light touch and pinprick.  Symmetric facial movement and palate elevation.  B/l hearing equal to finger rub.  5/5 strength with b/l sternocleidomastoid & trapezius.  Midline tongue protrusion, with no atrophy or fasciculations.  Motor: Normal bulk & tone in all four extremities.  At least 4/5 strength throughout left leg, without downward drift.  5/5 strength throughout b/l arms and right leg, without downward drift.  B/l hand  strength equal.  No rigidity, spasticity, or tremors in any of the four extremities.  Sensory: Intact to light touch and pinprick in all four extremities.  Reflex: 1+ and symmetric at b/l biceps, triceps, brachioradialis, patellae, and ankles.  Downgoing toes b/l.  Coordination: No dysmetria with b/l finger-to-nose and heel raise tests.  Gait and Romberg testing deferred due to difficulty standing and walking.    NIHSS 0 (no IV tPA because patient presented outside time window, with NIHSS 0, and ICH present)  MRS 2          LABORATORY VALUES:                        14.1   7.40  )-----------( 326      ( 06 Sep 2021 05:35 )             42.9       09-06    138  |  106  |  8   ----------------------------<  84  3.9   |  27  |  0.64    Ca    9.2      06 Sep 2021 05:35  Phos  2.9     09-06  Mg     2.3     09-06    TPro  7.1  /  Alb  3.5  /  TBili  0.6  /  DBili  x   /  AST  22  /  ALT  24  /  AlkPhos  86  09-06    09-06 Chol 159 LDL 96 HDL 52 Trig 79      A1C with Estimated Average Glucose (09.06.21 @ 10:23)   A1C with Estimated Average Glucose Result: 5.8%   Estimated Average Glucose: 120 mg/dL         NEUROIMAGING:      CT Head (9/5/21):  - Subacute right basal ganglia infarct with petechial hemorrhage  - Mild mass effect on right lateral ventricle frontal horn  - Mild chronic microvascular disease  - Diffuse cerebral and cerebellar atrophy              Please contact the Neurology consult service with any neurological questions.    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 Stroke workup  - CT Angiogram Head & Neck to evaluate for vascular abnormalities  - MRI Brain is not indicated since patient's subacute stroke is present on CT Head  - Transthoracic Echocardiogram  - Telemetry monitoring    2. Secondary stroke prevention  - Q4H Neurochecks & Vital signs  - Bedside swallow evaluation before administering any PO meds  - Hold antiplatelet agents for at least 7 days from presentation (14 days total) due to presence of petechial hemorrhage       - Thereafter, would start Aspirin 81mg daily (or aspirin 300mg CT daily if NPO)  - Atorvastatin 40mg QHS (goal LDL < 70 mg/dl - no need to increase dose to 80mg due to LDL already below 100 mg/dl)  - Treat BP if over 140/90 (goal /80) - Out of time window for permissive hypertension  - Prediabetes management  - PT/OT  - DVT ppx: SCDs (avoid heparin or enoxaparin for at least 7 days from presentation)          *******************************      CHIEF COMPLAINT:  Patient is a 86y old  Male who presents with a chief complaint of TIA/Stroke (06 Sep 2021 02:07)      HPI:  85 y/o man Creole speaking male, with h/o HTN, noncompliant with medication, just flew in from [facility] and presents here accompanied by daughter, reporting that about 1 week ago he had weakness and numbness to left side, difficulty walking, which lasted about 1-2 days and resolved spontaneously.  He was seen at a medical clinic in [facility] and was diagnosed to have a possible Stroke or TIA, however, no imaging or treatment was performed. According to patient's daughter, he is back to baseline, no more weakness is present, however, complains of slow gait and needs assistance to walk. Patient denies speech, visual, or mental status change, spasms, weakness, chest pain, headaches, diarrhea, constipation.       NEURO HPI:  86 LHM with transient left-sided weakness and numbness one week ago, improved but still with some left leg weakness and difficulty walking.      PAST MEDICAL & SURGICAL HISTORY:  Hypercholesterolemia  Hypertension  No significant past surgical history      MEDICATIONS:  atorvastatin 40 milliGRAM(s) Oral at bedtime  influenza   Vaccine 0.5 milliLiter(s) IntraMuscular once      ALLERGIES:  No Known Allergies      FAMILY HISTORY:  No reported family history of stroke      SOCIAL HISTORY:  No reported alcohol, tobacco, or illicit drug use      REVIEW OF SYSTEMS:  GENERAL: No fever, weight changes, fatigue  EYES: No eye pain or discharge  EAR/NOSE/MOUTH/THROAT: No sinus or throat pain; No difficulty hearing  NECK: No pain or stiffness  RESPIRATORY: No cough, wheezing, chills, or hemoptysis  CARDIOVASCULAR: No chest pain, palpitations, shortness of breath, or dyspnea on exertion  GASTROINTESTINAL: No abdominal pain, nausea, vomiting, hematemesis, diarrhea, or constipation  GENITOURINARY: No dysuria, frequency, hematuria, or incontinence  SKIN: No rashes or lesions  ENDOCRINE: No reported heat or cold intolerance  HEMATOLOGIC: No easy bruising  PSYCHIATRIC: No reported depression, anxiety, or mood swings  MUSCULOSKELETAL: No joint pain or swelling  NEUROLOGICAL: As per HPI        OBJECTIVE:    Vital Signs Last 24 Hrs  T(C): 36.9 (06 Sep 2021 20:08), Max: 36.9 (06 Sep 2021 00:32)  T(F): 98.4 (06 Sep 2021 20:08), Max: 98.5 (06 Sep 2021 00:32)  HR: 66 (06 Sep 2021 20:08) (66 - 82)  BP: 108/64 (06 Sep 2021 20:08) (101/65 - 152/69)  RR: 17 (06 Sep 2021 20:08) (16 - 18)  SpO2: 98% (06 Sep 2021 20:08) (95% - 100%)    General Examination:  General: No acute distress  HEENT: Atraumatic, Normocephalic  Respiratory: CTA B/l.  No crackles, rhonchi, or wheezes.  Cardiovascular: RRR.  Normal S1 & S2.  Normal b/l radial and pedal pulses.    Neurological Examination:  General / Mental Status: AAO x 1 (only to time).  No aphasia or dysarthria.  Naming and repetition intact.  Cranial Nerves: VFF x 4.  PERRL.  EOMI x 2, No nystagmus or diplopia.  B/l V1-V3 equal and intact to light touch and pinprick.  Symmetric facial movement and palate elevation.  B/l hearing equal to finger rub.  5/5 strength with b/l sternocleidomastoid & trapezius.  Midline tongue protrusion, with no atrophy or fasciculations.  Motor: Normal bulk & tone in all four extremities.  At least 4/5 strength throughout left leg, without downward drift.  5/5 strength throughout b/l arms and right leg, without downward drift.  B/l hand  strength equal.  No rigidity, spasticity, or tremors in any of the four extremities.  Sensory: Intact to light touch and pinprick in all four extremities.  Reflex: 1+ and symmetric at b/l biceps, triceps, brachioradialis, patellae, and ankles.  Downgoing toes b/l.  Coordination: No dysmetria with b/l finger-to-nose and heel raise tests.  Gait and Romberg testing deferred due to difficulty standing and walking.    NIHSS 0 (no IV tPA because patient presented outside time window, with NIHSS 0, and ICH present)  MRS 2          LABORATORY VALUES:                        14.1   7.40  )-----------( 326      ( 06 Sep 2021 05:35 )             42.9       09-06    138  |  106  |  8   ----------------------------<  84  3.9   |  27  |  0.64    Ca    9.2      06 Sep 2021 05:35  Phos  2.9     09-06  Mg     2.3     09-06    TPro  7.1  /  Alb  3.5  /  TBili  0.6  /  DBili  x   /  AST  22  /  ALT  24  /  AlkPhos  86  09-06 09-06 Chol 159 LDL 96 HDL 52 Trig 79      A1C with Estimated Average Glucose (09.06.21 @ 10:23)   A1C with Estimated Average Glucose Result: 5.8%   Estimated Average Glucose: 120 mg/dL         NEUROIMAGING:      CT Head (9/5/21):  - Subacute right basal ganglia infarct with petechial hemorrhage  - Mild mass effect on right lateral ventricle frontal horn  - Mild chronic microvascular disease  - Diffuse cerebral and cerebellar atrophy              Please contact the Neurology consult service with any neurological questions.    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 Stroke workup  - CT Angiogram Head & Neck to evaluate for vascular abnormalities  - MRI Brain is not indicated since patient's subacute stroke is present on CT Head  - Transthoracic Echocardiogram  - Telemetry monitoring    2. Secondary stroke prevention  - Q4H Neurochecks & Vital signs  - If patient has decline in cognitive status or new neurological deficits, obtain urgent CT Head to evaluate for growing ICH  - Bedside swallow evaluation before administering any PO meds  - Hold antiplatelet agents for at least 7 days from presentation (14 days total) due to presence of petechial hemorrhage       - Starting on 9/13/21, may start Aspirin 81mg daily (or aspirin 300mg [place] daily if NPO) unless there is clinical or neuroimaging evidence of growing ICH  - Atorvastatin 40mg QHS (goal LDL < 70 mg/dl - no need to increase dose to 80mg due to LDL already below 100 mg/dl)  - Treat BP if over 140/90 (goal /80) - Out of time window for permissive hypertension  - Prediabetes management  - PT/OT  - DVT ppx: SCDs (avoid heparin or enoxaparin for at least 7 days from presentation)          *******************************      CHIEF COMPLAINT:  Patient is a 86y old  Male who presents with a chief complaint of TIA/Stroke (06 Sep 2021 02:07)      HPI:  85 y/o man Creole speaking male, with h/o HTN, noncompliant with medication, just flew in from [facility] and presents here accompanied by daughter, reporting that about 1 week ago he had weakness and numbness to left side, difficulty walking, which lasted about 1-2 days and resolved spontaneously.  He was seen at a medical clinic in [facility] and was diagnosed to have a possible Stroke or TIA, however, no imaging or treatment was performed. According to patient's daughter, he is back to baseline, no more weakness is present, however, complains of slow gait and needs assistance to walk. Patient denies speech, visual, or mental status change, spasms, weakness, chest pain, headaches, diarrhea, constipation.       NEURO HPI:  86 LHM with transient left-sided weakness and numbness one week ago, improved but still with some left leg weakness and difficulty walking.      PAST MEDICAL & SURGICAL HISTORY:  Hypercholesterolemia  Hypertension  No significant past surgical history      MEDICATIONS:  atorvastatin 40 milliGRAM(s) Oral at bedtime  influenza   Vaccine 0.5 milliLiter(s) IntraMuscular once      ALLERGIES:  No Known Allergies      FAMILY HISTORY:  No reported family history of stroke      SOCIAL HISTORY:  No reported alcohol, tobacco, or illicit drug use      REVIEW OF SYSTEMS:  GENERAL: No fever, weight changes, fatigue  EYES: No eye pain or discharge  EAR/NOSE/MOUTH/THROAT: No sinus or throat pain; No difficulty hearing  NECK: No pain or stiffness  RESPIRATORY: No cough, wheezing, chills, or hemoptysis  CARDIOVASCULAR: No chest pain, palpitations, shortness of breath, or dyspnea on exertion  GASTROINTESTINAL: No abdominal pain, nausea, vomiting, hematemesis, diarrhea, or constipation  GENITOURINARY: No dysuria, frequency, hematuria, or incontinence  SKIN: No rashes or lesions  ENDOCRINE: No reported heat or cold intolerance  HEMATOLOGIC: No easy bruising  PSYCHIATRIC: No reported depression, anxiety, or mood swings  MUSCULOSKELETAL: No joint pain or swelling  NEUROLOGICAL: As per HPI        OBJECTIVE:    Vital Signs Last 24 Hrs  T(C): 36.9 (06 Sep 2021 20:08), Max: 36.9 (06 Sep 2021 00:32)  T(F): 98.4 (06 Sep 2021 20:08), Max: 98.5 (06 Sep 2021 00:32)  HR: 66 (06 Sep 2021 20:08) (66 - 82)  BP: 108/64 (06 Sep 2021 20:08) (101/65 - 152/69)  RR: 17 (06 Sep 2021 20:08) (16 - 18)  SpO2: 98% (06 Sep 2021 20:08) (95% - 100%)    General Examination:  General: No acute distress  HEENT: Atraumatic, Normocephalic  Respiratory: CTA B/l.  No crackles, rhonchi, or wheezes.  Cardiovascular: RRR.  Normal S1 & S2.  Normal b/l radial and pedal pulses.    Neurological Examination:  General / Mental Status: AAO x 1 (only to time).  No aphasia or dysarthria.  Naming and repetition intact.  Cranial Nerves: VFF x 4.  PERRL.  EOMI x 2, No nystagmus or diplopia.  B/l V1-V3 equal and intact to light touch and pinprick.  Symmetric facial movement and palate elevation.  B/l hearing equal to finger rub.  5/5 strength with b/l sternocleidomastoid & trapezius.  Midline tongue protrusion, with no atrophy or fasciculations.  Motor: Normal bulk & tone in all four extremities.  At least 4/5 strength throughout left leg, without downward drift.  5/5 strength throughout b/l arms and right leg, without downward drift.  B/l hand  strength equal.  No rigidity, spasticity, or tremors in any of the four extremities.  Sensory: Intact to light touch and pinprick in all four extremities.  Reflex: 1+ and symmetric at b/l biceps, triceps, brachioradialis, patellae, and ankles.  Downgoing toes b/l.  Coordination: No dysmetria with b/l finger-to-nose and heel raise tests.  Gait and Romberg testing deferred due to difficulty standing and walking.    NIHSS 0 (no IV tPA because patient presented outside time window, with NIHSS 0, and ICH present)  MRS 2          LABORATORY VALUES:                        14.1   7.40  )-----------( 326      ( 06 Sep 2021 05:35 )             42.9       09-06    138  |  106  |  8   ----------------------------<  84  3.9   |  27  |  0.64    Ca    9.2      06 Sep 2021 05:35  Phos  2.9     09-06  Mg     2.3     09-06    TPro  7.1  /  Alb  3.5  /  TBili  0.6  /  DBili  x   /  AST  22  /  ALT  24  /  AlkPhos  86  09-06 09-06 Chol 159 LDL 96 HDL 52 Trig 79      A1C with Estimated Average Glucose (09.06.21 @ 10:23)   A1C with Estimated Average Glucose Result: 5.8%   Estimated Average Glucose: 120 mg/dL         NEUROIMAGING:      CT Head (9/5/21):  - Subacute right basal ganglia infarct with petechial hemorrhage  - Mild mass effect on right lateral ventricle frontal horn  - Mild chronic microvascular disease  - Diffuse cerebral and cerebellar atrophy              Please contact the Neurology consult service with any neurological questions.    Jose M Doty MD   of Neurology  [facility] at [facility]

## 2021-09-06 NOTE — ED PROVIDER NOTE - PROGRESS NOTE DETAILS
D/w Dr. Kee, NSx attending, through transfer center and she advises no need for any neurosurgical intervention or transfer.  D/w Dr. Prescott for admission.   She agrees with no antiplatelet treatment for now given the petechial brain hemorrhage.

## 2021-09-06 NOTE — H&P ADULT - PROBLEM SELECTOR PLAN 1
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 Mild mass effect upon the frontal horn of the right lateral ventricle.  -EKG shows NSR with non specific T wave changes,  Troponin x 1 negative  -Will hold Aspirin and start as per neuro recommendations, Start Atorvastatin moderate dose (as per age)  -Holding BP medication for permissive HTN,  -Passed bedside dysphagia screening, will start on mechanical soft diet  -Monitor on Tele to r/o cardiac arrythmia  -F/u Echo, lipid profile, HbA1C  -MRI form in the chart  -Fall/aspiration precautions  -c/w neuro check  -PT evaluation to assess need for rehab  -Neurology consult: -Patient complains of left sided weakness 1 week ago with residual gait weakness  - H/O hypertension and hyperlipidemia, active smoker ans drinks Alcohol   -On PE: Non focal Neuro exam,   -NIHSS 0  -Ct scan shows 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 Mild mass effect upon the frontal horn of the right lateral ventricle.  -EKG shows NSR with non specific T wave changes,  Troponin x 1 negative  -Will hold Aspirin and start as per neuro recommendations, Start Atorvastatin moderate dose (as per age)  -Holding BP medication for permissive HTN,  -Passed bedside dysphagia screening, will start on mechanical soft diet  -Monitor on Tele to r/o cardiac arrythmia  -F/u Echo, lipid profile, HbA1C  -MRI form in the chart, Will likely benefit from MRI/MRA   -Fall/aspiration precautions  -c/w neuro check  -PT evaluation to assess need for rehab  -Neurology consult: -Patient complains of left sided weakness 1 week ago with residual gait weakness  - H/O hypertension and hyperlipidemia, active smoker and drinks Alcohol   -On PE: Non focal Neuro exam,   -NIHSS 0  -Ct scan shows 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 Mild mass effect upon the frontal horn of the right lateral ventricle.  -EKG shows NSR with non specific T wave changes,  Troponin x 1 negative  -Will hold Aspirin and start as per neuro recommendations, Start Atorvastatin moderate dose   -Holding BP medication for permissive HTN,  -Passed bedside dysphagia screening, will start on mechanical soft diet  -Monitor on Tele to r/o cardiac arrythmia  -F/u Echo, lipid profile, HbA1C  -MRI form in the chart, Will likely benefit from MRI/MRA   -Fall/aspiration precautions  -c/w neuro check  -PT evaluation to assess need for rehab  -Neurology consult:

## 2021-09-06 NOTE — H&P ADULT - ASSESSMENT
87 y/o man Creol speaking male, with h/o HTN, noncompliant with medication, just flew in from [facility] and presents here accompanied by daughter, reporting that about 1 week ago he had weakness and numbness to left side, difficulty walking, which lasted about 1-2 days and resolved spontaneously. Patient is admitted for CVA/TIA workup

## 2021-09-06 NOTE — H&P ADULT - PROBLEM SELECTOR PLAN 2
Patient is non compliant with medications and cannot recall the medicine name   Will hold regardless for permissive hypertension with goal SBP <220 for 24-48 hours   Dashe diet  f/u lipid profile, A1C,

## 2021-09-07 LAB
ALBUMIN SERPL ELPH-MCNC: 3.1 G/DL — LOW (ref 3.5–5)
ALP SERPL-CCNC: 87 U/L — SIGNIFICANT CHANGE UP (ref 40–120)
ALT FLD-CCNC: 22 U/L DA — SIGNIFICANT CHANGE UP (ref 10–60)
ANION GAP SERPL CALC-SCNC: 6 MMOL/L — SIGNIFICANT CHANGE UP (ref 5–17)
AST SERPL-CCNC: 17 U/L — SIGNIFICANT CHANGE UP (ref 10–40)
BILIRUB SERPL-MCNC: 0.5 MG/DL — SIGNIFICANT CHANGE UP (ref 0.2–1.2)
BUN SERPL-MCNC: 12 MG/DL — SIGNIFICANT CHANGE UP (ref 7–18)
CALCIUM SERPL-MCNC: 9.1 MG/DL — SIGNIFICANT CHANGE UP (ref 8.4–10.5)
CHLORIDE SERPL-SCNC: 107 MMOL/L — SIGNIFICANT CHANGE UP (ref 96–108)
CO2 SERPL-SCNC: 25 MMOL/L — SIGNIFICANT CHANGE UP (ref 22–31)
COVID-19 SPIKE DOMAIN AB INTERP: POSITIVE
COVID-19 SPIKE DOMAIN ANTIBODY RESULT: >250 U/ML — HIGH
CREAT SERPL-MCNC: 0.64 MG/DL — SIGNIFICANT CHANGE UP (ref 0.5–1.3)
GLUCOSE SERPL-MCNC: 89 MG/DL — SIGNIFICANT CHANGE UP (ref 70–99)
HCT VFR BLD CALC: 42.3 % — SIGNIFICANT CHANGE UP (ref 39–50)
HGB BLD-MCNC: 14.2 G/DL — SIGNIFICANT CHANGE UP (ref 13–17)
MCHC RBC-ENTMCNC: 30.1 PG — SIGNIFICANT CHANGE UP (ref 27–34)
MCHC RBC-ENTMCNC: 33.6 GM/DL — SIGNIFICANT CHANGE UP (ref 32–36)
MCV RBC AUTO: 89.6 FL — SIGNIFICANT CHANGE UP (ref 80–100)
NRBC # BLD: 0 /100 WBCS — SIGNIFICANT CHANGE UP (ref 0–0)
PLATELET # BLD AUTO: 353 K/UL — SIGNIFICANT CHANGE UP (ref 150–400)
POTASSIUM SERPL-MCNC: 4 MMOL/L — SIGNIFICANT CHANGE UP (ref 3.5–5.3)
POTASSIUM SERPL-SCNC: 4 MMOL/L — SIGNIFICANT CHANGE UP (ref 3.5–5.3)
PROT SERPL-MCNC: 6.9 G/DL — SIGNIFICANT CHANGE UP (ref 6–8.3)
RBC # BLD: 4.72 M/UL — SIGNIFICANT CHANGE UP (ref 4.2–5.8)
RBC # FLD: 12.5 % — SIGNIFICANT CHANGE UP (ref 10.3–14.5)
SARS-COV-2 IGG+IGM SERPL QL IA: >250 U/ML — HIGH
SARS-COV-2 IGG+IGM SERPL QL IA: POSITIVE
SODIUM SERPL-SCNC: 138 MMOL/L — SIGNIFICANT CHANGE UP (ref 135–145)
WBC # BLD: 7.35 K/UL — SIGNIFICANT CHANGE UP (ref 3.8–10.5)
WBC # FLD AUTO: 7.35 K/UL — SIGNIFICANT CHANGE UP (ref 3.8–10.5)

## 2021-09-07 PROCEDURE — 99233 SBSQ HOSP IP/OBS HIGH 50: CPT | Mod: GC

## 2021-09-07 PROCEDURE — 70496 CT ANGIOGRAPHY HEAD: CPT | Mod: 26

## 2021-09-07 PROCEDURE — 70498 CT ANGIOGRAPHY NECK: CPT | Mod: 26

## 2021-09-07 RX ADMIN — ATORVASTATIN CALCIUM 40 MILLIGRAM(S): 80 TABLET, FILM COATED ORAL at 22:00

## 2021-09-07 NOTE — PROGRESS NOTE ADULT - ATTENDING COMMENTS
Patient seen and examined. Case discussed with housestaff and MS3. Communicated with patient via Beninese Creole , [employer], #[id number]. Tele with some PVC's. Patient reports feeling well, states that he came to the hospital because he could not move the left side of his body but now it is normal. CT head revealed right basal ganglia and right corona radiata CVA with petechial hemorrhage with mild mass effect on the frontal horn of the right lateral ventricle. Neuro recs appreciated, CT-A revealing stable infarct. TTE with no PFO and reveals chronic HFpEF. PT eval ordered. Will f/u regarding further neuro recommendations. Remaining care as above. Patient seen and examined. Case discussed with housestaff and MS3. Communicated with patient via Sri [place]ankan Creole , [employer], #[id number]. Tele with some PVC's. Patient reports feeling well, states that he came to the hospital because he could not move the left side of his body but now it is normal. CT head revealed right basal ganglia and right corona radiata CVA with petechial hemorrhage with mild mass effect on the frontal horn of the right lateral ventricle. Neuro recs appreciated, CT-A revealing stable infarct. TTE with no PFO and reveals chronic HFpEF. PT eval ordered. Will f/u regarding further neuro recommendations. Remaining care as above. DC planning pending PT eval.

## 2021-09-07 NOTE — PROGRESS NOTE ADULT - PROBLEM SELECTOR PLAN 4
RISK                                                          Points  [] Previous VTE                                           3  [] Thrombophilia                                        2  [] Lower limb paralysis                              2   [] Current Cancer                                       2   [x] Immobilization > 24 hrs                        1  [] ICU/CCU stay > 24 hours                       1  [x] Age > 60                                                   1    Will hold chemical prophylaxis as patient have micro-hemorrhages on CT scan, will advance if cleared by neuro   SCD boots for now.

## 2021-09-07 NOTE — PROGRESS NOTE ADULT - SUBJECTIVE AND OBJECTIVE BOX
PGY 1 Note discussed with supervising resident and primary attending    CHIEF COMPLAINT AND BRIEF HOSPITAL COURSE:   The patient is a 87 y/o man Creole speaking male, with h/o HTN, noncompliant with medication, just flew in from [facility] and presents here accompanied by daughter, reporting that about 1 week ago he had weakness and numbness to left side, difficulty walking, which lasted about 1-2 days and resolved spontaneously.  He was seen at a medical clinic in [facility] and was diagnosed to have a possible Stroke or TIA, however ,no imaging or treatment was performed. According to patient's daughter, he is back to baseline, no more weakness is present, however, complains of slow gait and needs assistance to walk.   Patient denies speech, visual, or mental status change, spasms, weakness, chest pain, headaches, diarrhea, constipation.       INTERVAL HPI/OVERNIGHT EVENTS: ([facility]  [place] #[id number]) Telemetry reviewed, no events noted overnight. Patient seen and evaluated at the bedside. He states he is feeling well and has no complaints. He is aware that he is pending CTA head and neck for further evaluation.     MEDICATIONS  (STANDING):  atorvastatin 40 milliGRAM(s) Oral at bedtime  influenza   Vaccine 0.5 milliLiter(s) IntraMuscular once    MEDICATIONS  (PRN):      __________________________________________________  REVIEW OF SYSTEMS:  CONSTITUTIONAL: No fever   EYES: no acute visual disturbances  NECK: No pain or stiffness  RESPIRATORY: No cough; No shortness of breath  CARDIOVASCULAR: No chest pain, no palpitations  GASTROINTESTINAL: No pain. No nausea or vomiting; No diarrhea   NEUROLOGICAL: No headache or numbness, no tremors  MUSCULOSKELETAL: No joint pain, no muscle pain  GENITOURINARY: no dysuria, no frequency, no hesitancy  PSYCHIATRY: no depression , no anxiety  ALL OTHER ROS negative        Vital Signs Last 24 Hrs  T(C): 36.5 (07 Sep 2021 08:31), Max: 36.9 (06 Sep 2021 20:08)  T(F): 97.7 (07 Sep 2021 08:31), Max: 98.4 (06 Sep 2021 20:08)  HR: 78 (07 Sep 2021 08:31) (61 - 78)  BP: 110/67 (07 Sep 2021 08:31) (101/65 - 137/66)  BP(mean): --  RR: 17 (07 Sep 2021 08:31) (16 - 17)  SpO2: 98% (07 Sep 2021 08:31) (96% - 100%)    ________________________________________________  PHYSICAL EXAM:  GENERAL: NAD  HEENT: Normocephalic;  conjunctivae and sclerae clear; moist mucous membranes;   NECK : supple  CHEST/LUNG: Clear to auscultation bilaterally with good air entry   HEART: S1 S2  regular; no murmurs, gallops or rubs  ABDOMEN: Soft, Nontender, Nondistended; Bowel sounds present  EXTREMITIES: no cyanosis; no edema; no calf tenderness  SKIN: warm and dry; no rash  NERVOUS SYSTEM:  Awake and alert; Oriented  to place, person and time ; no new deficits    _________________________________________________  LABS:                        14.2   7.35  )-----------( 353      ( 07 Sep 2021 08:14 )             42.3     09-07    138  |  107  |  12  ----------------------------<  89  4.0   |  25  |  0.64    Ca    9.1      07 Sep 2021 08:14  Phos  2.9     09-06  Mg     2.3     09-06    TPro  6.9  /  Alb  3.1<L>  /  TBili  0.5  /  DBili  x   /  AST  17  /  ALT  22  /  AlkPhos  87  09-07    PT/INR - ( 06 Sep 2021 05:35 )   PT: 12.1 sec;   INR: 1.02 ratio             CAPILLARY BLOOD GLUCOSE            RADIOLOGY & ADDITIONAL TESTS:    Imaging Personally Reviewed:  YES    Consultant(s) Notes Reviewed:   YES    Care Discussed with Consultants : YES     Plan of care was discussed with patient and /or primary care giver; all questions and concerns were addressed and care was aligned with patient's wishes.     PGY 1 Note discussed with supervising resident and primary attending    CHIEF COMPLAINT AND BRIEF HOSPITAL COURSE:   The patient is a 85 y/o man Creole speaking male, with history of HTN, noncompliant with medication, just flew in from [facility] and presents here accompanied by daughter, reporting that about 1 week ago he had weakness and numbness to left side, difficulty walking, which lasted about 1-2 days and resolved spontaneously.  He was seen at a medical clinic in [facility] and was diagnosed to have a possible Stroke or TIA, however, no imaging or treatment was performed. Patient denies speech, visual, or mental status change, spasms, weakness, chest pain, headaches, diarrhea, constipation. According to patient's daughter, he is back to baseline, no more weakness is present, however, complains of slow gait and needs assistance to walk. NIHSS was 0 upon presentation. 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 Mild mass effect upon the frontal horn of the right lateral ventricle. Neurology was consulted and recommended CTA of the head and neck and holding anti-platelet drugs. CTA Head and Neck revealed a stable right basal ganglia lacunar infarct with trace petechial hemorrhage. Negative for acute vascular abnormality. PT was consulted to assess need for rehab.         INTERVAL HPI/OVERNIGHT EVENTS: ([employer]  [place] #[id number]) Telemetry reviewed, PVCs but no acute events noted overnight. Patient seen and evaluated at the bedside. He states he is feeling well and has no complaints, his presenting complaint of weakness is still resolved. He is aware that he is pending CTA head and neck for further evaluation, consent form filled out using [employer]  [place] #[id number].      MEDICATIONS  (STANDING):  atorvastatin 40 milliGRAM(s) Oral at bedtime  influenza   Vaccine 0.5 milliLiter(s) IntraMuscular once    MEDICATIONS  (PRN):      __________________________________________________  REVIEW OF SYSTEMS:  CONSTITUTIONAL: No fever   EYES: no acute visual disturbances  NECK: No pain or stiffness  RESPIRATORY: No cough; No shortness of breath  CARDIOVASCULAR: No chest pain, no palpitations  GASTROINTESTINAL: No pain. No nausea or vomiting; No diarrhea   NEUROLOGICAL: No headache or numbness, no tremors  MUSCULOSKELETAL: No joint pain, no muscle pain  GENITOURINARY: no dysuria, no frequency, no hesitancy  PSYCHIATRY: no depression , no anxiety  ALL OTHER ROS negative        Vital Signs Last 24 Hrs  T(C): 36.5 (07 Sep 2021 08:31), Max: 36.9 (06 Sep 2021 20:08)  T(F): 97.7 (07 Sep 2021 08:31), Max: 98.4 (06 Sep 2021 20:08)  HR: 78 (07 Sep 2021 08:31) (61 - 78)  BP: 110/67 (07 Sep 2021 08:31) (101/65 - 137/66)  BP(mean): --  RR: 17 (07 Sep 2021 08:31) (16 - 17)  SpO2: 98% (07 Sep 2021 08:31) (96% - 100%)    ________________________________________________  PHYSICAL EXAM:  GENERAL: NAD  HEENT: Normocephalic;  conjunctivae and sclerae clear; moist mucous membranes;   NECK : supple  CHEST/LUNG: Clear to auscultation bilaterally with good air entry   HEART: S1 S2  regular; no murmurs, gallops or rubs  ABDOMEN: Soft, Nontender, Nondistended; Bowel sounds present  EXTREMITIES: no cyanosis; no edema; no calf tenderness  SKIN: warm and dry; no rash  NERVOUS SYSTEM:  Awake and alert; Oriented  to place, person and time ; no new deficits; CN II-XII intact, 5/5 strength x4 extremities, sensation intact x4 extremities    _________________________________________________  LABS:                        14.2   7.35  )-----------( 353      ( 07 Sep 2021 08:14 )             42.3     09-07    138  |  107  |  12  ----------------------------<  89  4.0   |  25  |  0.64    Ca    9.1      07 Sep 2021 08:14  Phos  2.9     09-06  Mg     2.3     09-06    TPro  6.9  /  Alb  3.1<L>  /  TBili  0.5  /  DBili  x   /  AST  17  /  ALT  22  /  AlkPhos  87  09-07    PT/INR - ( 06 Sep 2021 05:35 )   PT: 12.1 sec;   INR: 1.02 ratio             CAPILLARY BLOOD GLUCOSE            RADIOLOGY & ADDITIONAL TESTS:    Imaging Personally Reviewed:  YES    Consultant(s) Notes Reviewed:   YES    Care Discussed with Consultants : YES     Plan of care was discussed with patient and /or primary care giver; all questions and concerns were addressed and care was aligned with patient's wishes.     PGY 1 Note discussed with supervising resident and primary attending    CHIEF COMPLAINT AND BRIEF HOSPITAL COURSE:   The patient is a 85 y/o man Creole speaking male, with history of HTN, noncompliant with medication, just flew in from [facility] and presents here accompanied by daughter, reporting that about 1 week ago he had weakness and numbness to left side, difficulty walking, which lasted about 1-2 days and resolved spontaneously.  He was seen at a medical clinic in [facility] and was diagnosed to have a possible Stroke or TIA, however, no imaging or treatment was performed. Patient denies speech, visual, or mental status change, spasms, weakness, chest pain, headaches, diarrhea, constipation. According to patient's daughter, he is back to baseline, no more weakness is present, however, complains of slow gait and needs assistance to walk. NIHSS was 0 upon presentation. CT head revealed a recent subacute infarct in the right basal ganglia and right corona radiata with small petechial foci of hemorrhage. Mild mass effect upon the frontal horn of the right lateral ventricle. Neurology was consulted and recommended CTA of the head and neck and holding anti-platelet drugs. CTA Head and Neck revealed a stable right basal ganglia lacunar infarct with trace petechial hemorrhage, and was negative for acute vascular abnormality. PT was consulted to assess need for rehab.         INTERVAL HPI/OVERNIGHT EVENTS: ([employer]  [place] #[id number]) Telemetry reviewed, PVCs but no acute events noted overnight. Patient seen and evaluated at the bedside. He states he is feeling well and has no complaints, his presenting complaint of weakness is still resolved. He is aware that he is pending CTA head and neck for further evaluation, consent form filled out using [employer]  [place] #[id number].      MEDICATIONS  (STANDING):  atorvastatin 40 milliGRAM(s) Oral at bedtime  influenza   Vaccine 0.5 milliLiter(s) IntraMuscular once    MEDICATIONS  (PRN):      __________________________________________________  REVIEW OF SYSTEMS:  CONSTITUTIONAL: No fever   EYES: no acute visual disturbances  NECK: No pain or stiffness  RESPIRATORY: No cough; No shortness of breath  CARDIOVASCULAR: No chest pain, no palpitations  GASTROINTESTINAL: No pain. No nausea or vomiting; No diarrhea   NEUROLOGICAL: No headache or numbness, no tremors  MUSCULOSKELETAL: No joint pain, no muscle pain  GENITOURINARY: no dysuria, no frequency, no hesitancy  PSYCHIATRY: no depression , no anxiety  ALL OTHER ROS negative        Vital Signs Last 24 Hrs  T(C): 36.5 (07 Sep 2021 08:31), Max: 36.9 (06 Sep 2021 20:08)  T(F): 97.7 (07 Sep 2021 08:31), Max: 98.4 (06 Sep 2021 20:08)  HR: 78 (07 Sep 2021 08:31) (61 - 78)  BP: 110/67 (07 Sep 2021 08:31) (101/65 - 137/66)  BP(mean): --  RR: 17 (07 Sep 2021 08:31) (16 - 17)  SpO2: 98% (07 Sep 2021 08:31) (96% - 100%)    ________________________________________________  PHYSICAL EXAM:  GENERAL: NAD  HEENT: Normocephalic;  conjunctivae and sclerae clear; moist mucous membranes;   NECK : supple  CHEST/LUNG: Clear to auscultation bilaterally with good air entry   HEART: S1 S2  regular; no murmurs, gallops or rubs  ABDOMEN: Soft, Nontender, Nondistended; Bowel sounds present  EXTREMITIES: no cyanosis; no edema; no calf tenderness  SKIN: warm and dry; no rash  NERVOUS SYSTEM:  Awake and alert; Oriented  to place, person and time ; no new deficits; CN II-XII intact, 5/5 strength x4 extremities, sensation intact x4 extremities    _________________________________________________  LABS:                        14.2   7.35  )-----------( 353      ( 07 Sep 2021 08:14 )             42.3     09-07    138  |  107  |  12  ----------------------------<  89  4.0   |  25  |  0.64    Ca    9.1      07 Sep 2021 08:14  Phos  2.9     09-06  Mg     2.3     09-06    TPro  6.9  /  Alb  3.1<L>  /  TBili  0.5  /  DBili  x   /  AST  17  /  ALT  22  /  AlkPhos  87  09-07    PT/INR - ( 06 Sep 2021 05:35 )   PT: 12.1 sec;   INR: 1.02 ratio             CAPILLARY BLOOD GLUCOSE            RADIOLOGY & ADDITIONAL TESTS:    Imaging Personally Reviewed:  YES    Consultant(s) Notes Reviewed:   YES    Care Discussed with Consultants : YES     Plan of care was discussed with patient and /or primary care giver; all questions and concerns were addressed and care was aligned with patient's wishes.

## 2021-09-07 NOTE — PROGRESS NOTE ADULT - ASSESSMENT
87 y/o man Creole speaking male, with h/o HTN, noncompliant with medication, just flew in from [facility] and presents here accompanied by daughter, reporting that about 1 week ago he had weakness and numbness to left side, difficulty walking, which lasted about 1-2 days and resolved spontaneously. Patient is admitted for CVA/TIA workup

## 2021-09-07 NOTE — PROGRESS NOTE ADULT - PROBLEM SELECTOR PLAN 2
- Patient has a h/o HTN, however he is non-compliant with medications and cannot recall the medicine name   - Patient with micro-hemorrhages on CT scan   - BPs stable without medication, c/w monitoring   - C/w DASH diet

## 2021-09-07 NOTE — PROGRESS NOTE ADULT - PROBLEM SELECTOR PLAN 1
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 Mild mass effect upon the frontal horn of the right lateral ventricle.  - EKG shows NSR with non specific T wave changes,  Troponin x 1 negative  - Will hold Aspirin and start as per neuro recommendations  - C/w Atorvastatin moderate dose   - Holding BP medication for permissive HTN,  - Passed bedside dysphagia screening, will start on mechanical soft diet  - Monitor on Tele to r/o cardiac arrythmia  - C/w neuro checks   - Fall/aspiration precautions  - TTE=Trace MR, grade I diastolic dysfunction, mild TR, negative bubble study, unable to estimate RVSP   - Lipid panel within normal limits  - [id number]=3.8  - PT evaluation to assess need for rehab  - Neurology consult: recommended CTA head and neck, will follow -Patient complains of an episode of left sided weakness 1 week ago with residual gait weakness  - H/o HTN and HLD, admits to alcohol use, states he quit smoking 1 week ago and does not want a nicotine patch  - On PE: Non focal Neuro exam, NIHSS 0  - CT head= 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 Mild mass effect upon the frontal horn of the right lateral ventricle.  - EKG shows NSR with non specific T wave changes,  Troponin x 1 negative  - Will hold Aspirin and start as per neuro recommendations  - C/w Atorvastatin moderate dose   - Holding BP medication for permissive HTN,  - Passed bedside dysphagia screening, will start on mechanical soft diet  - Monitor on Tele to r/o cardiac arrythmia  - C/w neuro checks   - Fall/aspiration precautions  - TTE=Trace MR, grade I diastolic dysfunction, mild TR, negative bubble study, unable to estimate RVSP   - Lipid panel within normal limits  - QgF4w=3.8  - PT evaluation to assess need for rehab  - Neurology consult: recommended CTA head & neck=Stable right basal ganglia lacunar infarct with trace petechial hemorrhage. Negative for acute vascular abnormality. Also rec holding antiplatelets, including ASA for now. Can start ASA 14 days after the incident, restart on 9/13. 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 Mild mass effect upon the frontal horn of the right lateral ventricle.  - EKG shows NSR with non specific T wave changes,  Troponin x 1 negative  - Will hold Aspirin and start as per neuro recommendations  - C/w Atorvastatin moderate dose   - BP medication initially held for permissive HTN, however, pt found to have micro-hemorrhages on CT scan  - Passed bedside dysphagia screening, will start on mechanical soft diet  - Monitor on Tele to r/o cardiac arrythmia  - C/w neuro checks   - Fall/aspiration precautions  - TTE=Trace MR, grade I diastolic dysfunction, mild TR, negative bubble study, unable to estimate RVSP   - Lipid panel within normal limits  - [id number]=6.8  - PT evaluation to assess need for rehab  - Neurology consult: recommended CTA head & neck=Stable right basal ganglia lacunar infarct with trace petechial hemorrhage. Negative for acute vascular abnormality. Also rec holding antiplatelets, including ASA for now. Can start ASA 14 days after the incident, restart on 9/13.

## 2021-09-08 ENCOUNTER — TRANSCRIPTION ENCOUNTER (OUTPATIENT)
Age: 86
End: 2021-09-08

## 2021-09-08 DIAGNOSIS — E78.5 HYPERLIPIDEMIA, UNSPECIFIED: ICD-10-CM

## 2021-09-08 LAB
ALBUMIN SERPL ELPH-MCNC: 3.1 G/DL — LOW (ref 3.5–5)
ALP SERPL-CCNC: 82 U/L — SIGNIFICANT CHANGE UP (ref 40–120)
ALT FLD-CCNC: 24 U/L DA — SIGNIFICANT CHANGE UP (ref 10–60)
ANION GAP SERPL CALC-SCNC: 8 MMOL/L — SIGNIFICANT CHANGE UP (ref 5–17)
AST SERPL-CCNC: 17 U/L — SIGNIFICANT CHANGE UP (ref 10–40)
BILIRUB SERPL-MCNC: 0.4 MG/DL — SIGNIFICANT CHANGE UP (ref 0.2–1.2)
BUN SERPL-MCNC: 14 MG/DL — SIGNIFICANT CHANGE UP (ref 7–18)
CALCIUM SERPL-MCNC: 9 MG/DL — SIGNIFICANT CHANGE UP (ref 8.4–10.5)
CHLORIDE SERPL-SCNC: 108 MMOL/L — SIGNIFICANT CHANGE UP (ref 96–108)
CO2 SERPL-SCNC: 24 MMOL/L — SIGNIFICANT CHANGE UP (ref 22–31)
CREAT SERPL-MCNC: 0.75 MG/DL — SIGNIFICANT CHANGE UP (ref 0.5–1.3)
GLUCOSE SERPL-MCNC: 86 MG/DL — SIGNIFICANT CHANGE UP (ref 70–99)
HCT VFR BLD CALC: 43.5 % — SIGNIFICANT CHANGE UP (ref 39–50)
HGB BLD-MCNC: 14.3 G/DL — SIGNIFICANT CHANGE UP (ref 13–17)
MCHC RBC-ENTMCNC: 30 PG — SIGNIFICANT CHANGE UP (ref 27–34)
MCHC RBC-ENTMCNC: 32.9 GM/DL — SIGNIFICANT CHANGE UP (ref 32–36)
MCV RBC AUTO: 91.2 FL — SIGNIFICANT CHANGE UP (ref 80–100)
NRBC # BLD: 0 /100 WBCS — SIGNIFICANT CHANGE UP (ref 0–0)
PLATELET # BLD AUTO: 369 K/UL — SIGNIFICANT CHANGE UP (ref 150–400)
POTASSIUM SERPL-MCNC: 3.9 MMOL/L — SIGNIFICANT CHANGE UP (ref 3.5–5.3)
POTASSIUM SERPL-SCNC: 3.9 MMOL/L — SIGNIFICANT CHANGE UP (ref 3.5–5.3)
PROT SERPL-MCNC: 6.9 G/DL — SIGNIFICANT CHANGE UP (ref 6–8.3)
RBC # BLD: 4.77 M/UL — SIGNIFICANT CHANGE UP (ref 4.2–5.8)
RBC # FLD: 12.7 % — SIGNIFICANT CHANGE UP (ref 10.3–14.5)
SODIUM SERPL-SCNC: 140 MMOL/L — SIGNIFICANT CHANGE UP (ref 135–145)
WBC # BLD: 8.15 K/UL — SIGNIFICANT CHANGE UP (ref 3.8–10.5)
WBC # FLD AUTO: 8.15 K/UL — SIGNIFICANT CHANGE UP (ref 3.8–10.5)

## 2021-09-08 PROCEDURE — 99232 SBSQ HOSP IP/OBS MODERATE 35: CPT | Mod: GC

## 2021-09-08 RX ADMIN — ATORVASTATIN CALCIUM 40 MILLIGRAM(S): 80 TABLET, FILM COATED ORAL at 21:42

## 2021-09-08 NOTE — PROGRESS NOTE ADULT - ASSESSMENT
87 y/o man Creole speaking male, with h/o HTN, noncompliant with medication, just flew in from [facility] and presents here accompanied by daughter, reporting that about 1 week ago he had weakness and numbness to left side, difficulty walking, which lasted about 1-2 days and resolved spontaneously. Patient is admitted for CVA/TIA workup  87 y/o man Creole speaking male, with h/o HTN, noncompliant with medication, just flew in from [facility] and presents here accompanied by daughter, reporting that about 1 week ago he had weakness and numbness to left side, difficulty walking, which lasted about 1-2 days and resolved spontaneously. Patient is admitted for CVA/TIA workup.

## 2021-09-08 NOTE — PROGRESS NOTE ADULT - PROBLEM SELECTOR PLAN 4
RISK                                                          Points  [] Previous VTE                                           3  [] Thrombophilia                                        2  [] Lower limb paralysis                              2   [] Current Cancer                                       2   [x] Immobilization > 24 hrs                        1  [] ICU/CCU stay > 24 hours                       1  [x] Age > 60                                                   1    Will hold chemical prophylaxis as patient have micro-hemorrhages on CT scan, will advance if cleared by neuro   SCD boots for now. RISK                                                          Points  [] Previous VTE                                           3  [] Thrombophilia                                        2  [] Lower limb paralysis                              2   [] Current Cancer                                       2   [x] Immobilization > 24 hrs                        1  [] ICU/CCU stay > 24 hours                       1  [x] Age > 60                                                   1    Will hold chemical prophylaxis as patient have micro-hemorrhages on CT scan, will advance if cleared by neuro   Patient is OOB to chair and walking with assistance  SCD boots for now.

## 2021-09-08 NOTE — PROGRESS NOTE ADULT - PROBLEM SELECTOR PLAN 2
- Patient has a h/o HTN, however he is non-compliant with medications and cannot recall the medicine name   - Patient with micro-hemorrhages on CT scan   - BPs stable without medication, c/w monitoring   - C/w DASH diet - Patient has a h/o HTN, however he is non-compliant with medications and cannot recall the medicine name   - Patient with micro-hemorrhages on CT scan   - Per neuro, treat BP if >140/80 with goal 120/80  - BPs stable without medication, c/w monitoring   - C/w DASH diet - Patient has a h/o HTN, however he is non-compliant with medications and cannot recall the medicine name   - Patient with micro-hemorrhages on CT scan   - Per neuro, treat BP if >140/90 with goal 120/80  - BPs stable without medication, c/w monitoring   - C/w DASH diet

## 2021-09-08 NOTE — PHYSICAL THERAPY INITIAL EVALUATION ADULT - LEVEL OF INDEPENDENCE: SCOOT/BRIDGE, REHAB EVAL
I performed a history and physical exam of the patient and discussed their management with the resident. I reviewed the resident's note and agree with the documented findings and plan of care. My medical decison making and observations are found above.
independent

## 2021-09-08 NOTE — PHYSICAL THERAPY INITIAL EVALUATION ADULT - PERTINENT HX OF CURRENT PROBLEM, REHAB EVAL
Pt. admitted  with cc: 1 week PTA he had weakness and numbness to left side, difficulty walking, which lasted about 1-2 days and resolved spontaneously. 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 Mild mass effect upon the frontal horn of the right lateral ventricle.

## 2021-09-08 NOTE — PROGRESS NOTE ADULT - PROBLEM SELECTOR PLAN 1
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 Mild mass effect upon the frontal horn of the right lateral ventricle.  - EKG shows NSR with non specific T wave changes,  Troponin x 1 negative  - Will hold Aspirin and start as per neuro recommendations  - C/w Atorvastatin moderate dose   - BP medication initially held for permissive HTN, however, pt found to have micro-hemorrhages on CT scan  - Passed bedside dysphagia screening, will start on mechanical soft diet  - Monitor on Tele to r/o cardiac arrythmia  - C/w neuro checks   - Fall/aspiration precautions  - TTE=Trace MR, grade I diastolic dysfunction, mild TR, negative bubble study, unable to estimate RVSP   - Lipid panel within normal limits  - ApU2g=5.8  - PT evaluation to assess need for rehab  - Neurology consult: recommended CTA head & neck=Stable right basal ganglia lacunar infarct with trace petechial hemorrhage. Negative for acute vascular abnormality. Also rec holding antiplatelets, including ASA for now. Can start ASA 14 days after the incident, restart on 9/13. 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 Mild mass effect upon the frontal horn of the right lateral ventricle.  - EKG shows NSR with non specific T wave changes,  Troponin x 1 negative  - Will hold Aspirin and start as per neuro recommendations  - C/w Atorvastatin moderate dose   - BP medication initially held for permissive HTN, however, pt found to have micro-hemorrhages on CT scan. Per neuro, treat BP if >140/80 with goal 120/80  - Passed bedside dysphagia screening, will start on mechanical soft diet  - Monitor on Tele to r/o cardiac arrythmia  - C/w neuro checks   - Fall/aspiration precautions  - TTE=Trace MR, grade I diastolic dysfunction, mild TR, negative bubble study, unable to estimate RVSP   - Lipid panel within normal limits  - IfT7o=2.8  - PT evaluation to assess need for rehab  - Neurology consult: recommended CTA head & neck=Stable right basal ganglia lacunar infarct with trace petechial hemorrhage. Negative for acute vascular abnormality. Also rec holding antiplatelets, including ASA for now. Can start ASA 14 days after the incident, restart on 9/13. 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 Mild mass effect upon the frontal horn of the right lateral ventricle.  - EKG shows NSR with non specific T wave changes,  Troponin x 1 negative  - Will hold Aspirin and start as per neuro recommendations  - C/w Atorvastatin moderate dose given lipid panel within normal limits   - BP medication initially held for permissive HTN, however, pt found to have micro-hemorrhages on CT scan. Per neuro, treat BP if >140/90 with goal 120/80  - Passed bedside dysphagia screening, will start on mechanical soft diet  - Monitor on Tele to r/o cardiac arrythmia  - C/w neuro checks   - Fall/aspiration precautions  - TTE=Trace MR, grade I diastolic dysfunction, mild TR, negative bubble study, unable to estimate RVSP   - Lipid panel within normal limits  - MdW5u=8.8  - PT evaluation to assess need for rehab  - Neurology consult: recommended CTA head & neck=Stable right basal ganglia lacunar infarct with trace petechial hemorrhage. Negative for acute vascular abnormality. Also rec holding antiplatelets, including ASA for now. Can start ASA 14 days after the incident, restart on 9/13. 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 Mild mass effect upon the frontal horn of the right lateral ventricle.  - EKG shows NSR with non specific T wave changes,  Troponin x 2 negative  - Will hold Aspirin and start as per neuro recommendations  - C/w Atorvastatin moderate dose given lipid panel within normal limits   - BP medication initially held for permissive HTN, however, pt found to have micro-hemorrhages on CT scan. Per neuro, treat BP if >140/90 with goal 120/80  - Passed bedside dysphagia screening, will start on mechanical soft diet  - telemetry d/c, no telemetry events were observed.  - C/w neuro checks   - Fall/aspiration precautions  - TTE=Trace MR, grade I diastolic dysfunction, mild TR, negative bubble study, unable to estimate RVSP   - Lipid panel within normal limits  - [id number]=4.8  - PT evaluation to assess need for rehab  - Neurology consult: recommended CTA head & neck=Stable right basal ganglia lacunar infarct with trace petechial hemorrhage. Negative for acute vascular abnormality. Also rec holding antiplatelets, including ASA for now. Can start ASA 14 days after the incident, restart on 9/13.

## 2021-09-08 NOTE — PROGRESS NOTE ADULT - PROBLEM SELECTOR PLAN 3
- Started atorvastatin 40mg daily, will continue  - Lipid panel within normal limits - Started atorvastatin 40mg daily per neuro recs, will continue  - Lipid panel within normal limits

## 2021-09-08 NOTE — DISCHARGE NOTE PROVIDER - CARE PROVIDER_API CALL
Marisela Ramirez  NEUROLOGY  [address] [address] [place] [id number]  Phone: [phone]  Fax: [phone]  Follow Up Time:

## 2021-09-08 NOTE — DISCHARGE NOTE PROVIDER - HOSPITAL COURSE
This is an 86 year old man Creole speaking male, recently immigrated from [facility] where he lived at home living with family, has a PMHx HTN, noncompliant with medication, and Hypercholesterolemia brought in by daughter, reporting that about 1 week ago he had weakness and numbness to left side, difficulty walking, which lasted about 1-2 days and resolved spontaneously.  He was seen at a medical clinic in [facility] and was diagnosed to have a possible Stroke or TIA, however ,no imaging or treatment was performed. According to patient's daughter, he is back to baseline, no more weakness is present, however, complains of slow gait and needs assistance to walk.   Patient denies speech, visual, or mental status change, spasms, weakness, chest pain, headaches, diarrhea, constipation. The patient is a 87 y/o man Creole speaking male, with history of HTN, noncompliant with medication, just flew in from [facility] and presents here accompanied by daughter, reporting that about 1 week ago he had weakness and numbness to left side, difficulty walking, which lasted about 1-2 days and resolved spontaneously.  He was seen at a medical clinic in [facility] and was diagnosed to have a possible Stroke or TIA, however, no imaging or treatment was performed. Patient denies speech, visual, or mental status change, spasms, weakness, chest pain, headaches, diarrhea, constipation. According to patient's daughter, he is back to baseline, no more weakness is present, however, complains of slow gait and needs assistance to walk. NIHSS was 0 upon presentation. CT head revealed a recent subacute infarct in the right basal ganglia and right corona radiata with small petechial foci of hemorrhage. Mild mass effect upon the frontal horn of the right lateral ventricle. Neurology was consulted and recommended CTA of the head and neck and holding anti-platelet drugs. CTA Head and Neck revealed a stable right basal ganglia lacunar infarct with trace petechial hemorrhage, and was negative for acute vascular abnormality. Upon presentation, his blood pressure was elevated however repeat BP came down to an acceptable level. Per Neurology recommendations, treat if greater than 140/90 with a goal of 120/80. Patient's BP did not exceed 140/90 and treatment was not initiated. PT was consulted to assess need for physical rehabilitation. The patient remained asymptomatic without weakness or other neurological symptoms throughout his hospital stay. He was started on Atorvastatin 40mg and counselled on smoking cessation. Per Neurology recommendations, he is able to start taking Aspirin 81mg starting 14 days after the symptomatic event, on 9/13/2021 at the earliest. The patient smokes 10 cigarettes a day, however he states he quit without assistance 1 week ago. He was counselled and provided with information on smoking cessation. The patient is a 87 y/o man Creole speaking male, with history of HTN, noncompliant with medication, just flew in from [facility] and presents here accompanied by daughter, reporting that about 1 week ago he had weakness and numbness to left side, difficulty walking, which lasted about 1-2 days and resolved spontaneously.  He was seen at a medical clinic in [facility] and was diagnosed to have a possible Stroke or TIA, however, no imaging or treatment was performed. Patient denies speech, visual, or mental status change, spasms, weakness, chest pain, headaches, diarrhea, constipation. According to patient's daughter, he is back to baseline, no more weakness is present, however, complains of slow gait and needs assistance to walk. NIHSS was 0 upon presentation. CT head revealed a recent subacute infarct in the right basal ganglia and right corona radiata with small petechial foci of hemorrhage. Mild mass effect upon the frontal horn of the right lateral ventricle. Neurology was consulted and recommended CTA of the head and neck and holding anti-platelet drugs. CTA Head and Neck revealed a stable right basal ganglia lacunar infarct with trace petechial hemorrhage, and was negative for acute vascular abnormality. Upon presentation, his blood pressure was elevated however repeat BP came down to an acceptable level. Per Neurology recommendations, treat if greater than 140/90 with a goal of 120/80. Patient's BP did not exceed 140/90 and treatment was not initiated. PT was consulted to assess need for physical rehabilitation, and recommended the patient be discharged home with home PT as well as a rolling walker. Case Management assisted and provided a rolling walker. The patient remained asymptomatic without weakness or other neurological symptoms throughout his hospital stay. He was started on Atorvastatin 40mg and counselled on smoking cessation. Per Neurology recommendations, he is able to start taking Aspirin 81mg starting 14 days after the symptomatic event, on 9/13/2021 at the earliest. The patient smokes 10 cigarettes a day, however he states he quit without assistance 1 week ago. He was counselled and provided with information on smoking cessation. The patient is a 85 y/o man Creole speaking male, with history of HTN, noncompliant with medication, just flew in from [facility] and presents here accompanied by daughter, reporting that about 1 week ago he had weakness and numbness to left side, difficulty walking, which lasted about 1-2 days and resolved spontaneously. He was seen at a medical clinic in [facility] and was diagnosed to have a possible Stroke or TIA, however, no imaging or treatment was performed. Patient denies speech, visual, or mental status change, spasms, weakness, chest pain, headaches, diarrhea, constipation. According to patient's daughter, he is back to baseline, no more weakness is present, however, complains of slow gait and needs assistance to walk. Patient admitted to telemetry for stoke w/u, presenting with a NIHSS of 0. CT head revealed a recent subacute infarct in the right basal ganglia and right corona radiata with small petechial foci of hemorrhage with mild mass effect upon the frontal horn of the right lateral ventricle. Neurology was consulted and recommended CTA of the head and neck and continue to hold  aspirin and anti-platelet therapy. CTA Head and Neck revealed a stable right basal ganglia lacunar infarct with trace petechial hemorrhage, and was negative for acute vascular abnormality. Upon presentation, his blood pressure was elevated however repeat BP came down to an acceptable level. Per Neurology recommendations, treat if greater than 140/90 with a goal of 120/80. Patient's BP did not exceed 140/90 and treatment was not initiated. PT was consulted to assess need for physical rehabilitation, and recommended the patient be discharged home with home PT for 3-5x/week for 4 weeks, as well as a rolling walker. Case Management assisted and provided a rolling walker. He was started on Atorvastatin 40mg and counselled on smoking cessation. Per Neurology recommendations, he is able to start taking Aspirin 81mg starting 14 days after the symptomatic event, on 9/13/2021 at the earliest. The patient smokes 10 cigarettes a day, however he states he quit without assistance 1 week ago. He was counselled and provided with information on smoking cessation. Throughout the admission patient remained asymptomatic without weakness or other neurological symptoms throughout his hospital stay, as well as no cardiac events recorded on telemetry. Patient remains stable for discharge     The patient is a 87 y/o man Creole speaking male, with history of HTN, noncompliant with medication, just flew in from [facility] and presents here accompanied by daughter, reporting that about 1 week ago he had weakness and numbness to left side, difficulty walking, which lasted about 1-2 days and resolved spontaneously. He was seen at a medical clinic in [facility] and was diagnosed to have a possible Stroke or TIA, however, no imaging or treatment was performed. Patient denies speech, visual, or mental status change, spasms, weakness, chest pain, headaches, diarrhea, constipation. According to patient's daughter, he is back to baseline, no more weakness is present, however, complains of slow gait and needs assistance to walk. Patient admitted to telemetry for stoke w/u, presenting with a NIHSS of 0. CT head revealed a recent subacute infarct in the right basal ganglia and right corona radiata with small petechial foci of hemorrhage with mild mass effect upon the frontal horn of the right lateral ventricle. Neurology was consulted and recommended CTA of the head and neck and continue to hold  aspirin and anti-platelet therapy. CTA Head and Neck revealed a stable right basal ganglia lacunar infarct with trace petechial hemorrhage, and was negative for acute vascular abnormality. Upon presentation, his blood pressure was elevated however repeat BP came down to an acceptable level. Per Neurology recommendations, treat if greater than 140/90 with a goal of 120/80. Patient's BP did not exceed 140/90 and treatment was not initiated. PT was consulted to assess need for physical rehabilitation, and recommended the patient be discharged home with home PT for 3-5x/week for 4 weeks, as well as a rolling walker. Case Management assisted and provided a rolling walker. He was started on Atorvastatin 40mg and counselled on smoking cessation. Per Neurology recommendations, he is able to start taking Aspirin 81mg starting 14 days after the symptomatic event, on 9/13/2021 at the earliest. The patient smokes 10 cigarettes a day, however he states he quit without assistance 1 week ago. He was counselled and provided with information on smoking cessation. Throughout the admission patient remained asymptomatic without weakness or other neurological symptoms throughout his hospital stay, as well as no cardiac events recorded on telemetry. Patient remains stable for discharged. Patient is recommended to follow up with community PCP and Neurologist for outpatient management.      The patient is a 85 y/o man Creole speaking male, with history of HTN, noncompliant with medication, just flew in from [facility] and presents here accompanied by daughter, reporting that about 1 week ago he had weakness and numbness to left side, difficulty walking, which lasted about 1-2 days and resolved spontaneously. He was seen at a medical clinic in [facility] and was diagnosed to have a possible Stroke or TIA, however, no imaging or treatment was performed. Patient denies speech, visual, or mental status change, spasms, weakness, chest pain, headaches, diarrhea, constipation. According to patient's daughter, he is back to baseline, no more weakness is present, however, complains of slow gait and needs assistance to walk. Patient admitted to telemetry for stoke w/u, presenting with a NIHSS of 0. CT head revealed a recent subacute infarct in the right basal ganglia and right corona radiata with small petechial foci of hemorrhage with mild mass effect upon the frontal horn of the right lateral ventricle. Neurology was consulted and recommended CTA of the head and neck and continue to hold  aspirin and anti-platelet therapy. CTA Head and Neck revealed a stable right basal ganglia lacunar infarct with trace petechial hemorrhage, and was negative for acute vascular abnormality. Upon presentation, his blood pressure was elevated however repeat BP came down to an acceptable level. Per Neurology recommendations, treat if greater than 140/90 with a goal of 120/80. Patient's BP did not exceed 140/90 and treatment was not initiated. PT was consulted to assess need for physical rehabilitation, and recommended the patient be discharged home with home PT for 3-5x/week for 4 weeks, as well as a rolling walker. Case Management assisted and provided a rolling walker. He was started on Atorvastatin 40mg and counselled on smoking cessation. Per Neurology recommendations, he is able to start taking Aspirin 81mg and Plavix 75mg OD starting 14 days after the symptomatic event, on 9/13/2021 at the earliest. The patient smokes 10 cigarettes a day, however he states he quit without assistance 1 week ago. He was counselled and provided with information on smoking cessation. Throughout the admission patient remained asymptomatic without weakness or other neurological symptoms throughout his hospital stay, as well as no cardiac events recorded on telemetry. Patient remains stable for discharged. Patient is recommended to follow up with community PCP and Neurologist for outpatient management.      The patient is a 87 y/o man Creole speaking male, with history of HTN, noncompliant with medication, just flew in from [facility] and presents here accompanied by daughter, reporting that about 1 week ago he had weakness and numbness to left side, difficulty walking, which lasted about 1-2 days and resolved spontaneously. He was seen at a medical clinic in [facility] and was diagnosed to have a possible Stroke or TIA, however, no imaging or treatment was performed. Patient denies speech, visual, or mental status change, spasms, weakness, chest pain, headaches, diarrhea, constipation. According to patient's daughter, he is back to baseline, no more weakness is present, however, complains of slow gait and needs assistance to walk. Patient admitted to telemetry for stoke w/u, presenting with a NIHSS of 0. CT head revealed a recent subacute infarct in the right basal ganglia and right corona radiata with small petechial foci of hemorrhage with mild mass effect upon the frontal horn of the right lateral ventricle. Neurology was consulted and recommended CTA of the head and neck and continue to hold  aspirin and anti-platelet therapy. CTA Head and Neck revealed a stable right basal ganglia lacunar infarct with trace petechial hemorrhage, and was negative for acute vascular abnormality. Upon presentation, his blood pressure was elevated however repeat BP came down to an acceptable level. Per Neurology recommendations, treat if greater than 140/90 with a goal of 120/80. Patient's BP did not exceed 140/90 and treatment was not initiated. PT was consulted to assess need for physical rehabilitation, and recommended the patient be discharged home with home PT for 3-5x/week for 4 weeks, as well as a rolling walker. Case Management assisted and provided a rolling walker. He was started on Atorvastatin 40mg and counselled on smoking cessation. Per Neurology recommendations, he is able to start taking Aspirin 81mg a starting 14 days after the symptomatic event, on 9/13/2021 at the earliest. The patient smokes 10 cigarettes a day, however he states he quit without assistance 1 week ago. He was counselled and provided with information on smoking cessation. Throughout the admission patient remained asymptomatic without weakness or other neurological symptoms throughout his hospital stay, as well as no cardiac events recorded on telemetry. Patient remains stable for discharged. Patient is recommended to follow up with community PCP and Neurologist for outpatient management.      The patient is a 87 y/o man Creole speaking male, with history of HTN, noncompliant with medication, just flew in from [facility] and presents here accompanied by daughter, reporting that about 1 week ago he had weakness and numbness to left side, difficulty walking, which lasted about 1-2 days and resolved spontaneously. He was seen at a medical clinic in [facility] and was diagnosed to have a possible Stroke or TIA, however, no imaging or treatment was performed. Patient denies speech, visual, or mental status change, spasms, weakness, chest pain, headaches, diarrhea, constipation. According to patient's daughter, he is back to baseline, no more weakness is present, however, complains of slow gait and needs assistance to walk. Patient admitted to telemetry for stoke w/u, presenting with a NIHSS of 0. CT head revealed a recent subacute infarct in the right basal ganglia and right corona radiata with small petechial foci of hemorrhage with mild mass effect upon the frontal horn of the right lateral ventricle. Neurology was consulted and recommended CTA of the head and neck and continue to hold  aspirin and anti-platelet therapy. CTA Head and Neck revealed a stable right basal ganglia lacunar infarct with trace petechial hemorrhage, and was negative for acute vascular abnormality. Upon presentation, his blood pressure was elevated however repeat BP came down to an acceptable level. Per Neurology recommendations, treat if greater than 140/90 with a goal of 120/80. Patient's BP did not exceed 140/90 and treatment was not initiated. PT was consulted to assess need for physical rehabilitation, and recommended the patient be discharged home with home PT for 3-5x/week for 4 weeks, as well as a rolling walker. Case Management assisted and provided a rolling walker. He was started on Atorvastatin 40mg and counselled on smoking cessation. Per discussion with Dr. Ramirez, patient should take 21 days of aspirin and plavix starting 14 days after the event, 9/13/21 at the earliest. The patient smokes 10 cigarettes a day, however he states he quit without assistance 1 week ago. He was counselled and provided with information on smoking cessation. Throughout the admission patient remained asymptomatic without weakness or other neurological symptoms throughout his hospital stay, as well as no cardiac events recorded on telemetry. Patient remains stable for discharged. Patient is recommended to follow up with community PCP and Neurologist for outpatient management.

## 2021-09-08 NOTE — DISCHARGE NOTE PROVIDER - NSDCCPCAREPLAN_GEN_ALL_CORE_FT
PRINCIPAL DISCHARGE DIAGNOSIS  Diagnosis: Stroke of right basal ganglia  Assessment and Plan of Treatment: You presented to the hospital approximately 1 week after an episode of left-sided weakness while you were in [facility]. You went to a clinic at that time and were told it was likely a Transient Ischemic Attack (TIA) or a Stroke, however no imaging was performed at that time. You were admitted to the hospital to rule out TIA versus Stroke. At the time of presentation, you were not having any symptoms and your neurological exam was normal. A CT scan of your head showed a recent subacute infarct in the righ tbasal ganglia and right corona radiata with a small area of surrounding hemorrhage. Your EKG was normal sinus rhythm with nonspecific T wave changes. Your cardiac enzymes were negative x1. You were monitored on telemetry without any acute events. Throughout your hospital stay, you did not have any further episodes of weakness. Your neurological exam remained normal.   Per Neurology recommendations, you were started on Atorvastatin 40mg. Your lipid panel was within normal limits. You will continue taking this medication at home, one tablet once daily. Starting on September 13th, 2021, you may begin taking one 81mg tablet of aspirin daily for prevention.      SECONDARY DISCHARGE DIAGNOSES  Diagnosis: Hypertension  Assessment and Plan of Treatment: You have a history of hypertension for which you are not currently taking any medications at home.    Diagnosis: Encounter for smoking cessation counseling  Assessment and Plan of Treatment: You have been smoking 10 cigarettes a day for many years. You stated that you quit smoking 1 week ago. It is very important for your health that you do not smoke any more cigarettes. You were provided with resources to assist in quitting.    Diagnosis: Hyperlipidemia  Assessment and Plan of Treatment:      PRINCIPAL DISCHARGE DIAGNOSIS  Diagnosis: Stroke of right basal ganglia  Assessment and Plan of Treatment: You presented to the hospital approximately 1 week after an episode of left-sided weakness while you were in [facility]. You went to a clinic at that time and were told it was likely a Transient Ischemic Attack (TIA) or a Stroke, however no imaging was performed at that time. You were admitted to the hospital to rule out TIA versus Stroke. At the time of presentation, you were not having any symptoms and your neurological exam was normal. A CT scan of your head showed a recent subacute infarct in the [facility] t[facility]al ganglia and right corona radiata with a small area of surrounding hemorrhage. Your EKG was normal sinus rhythm with nonspecific T wave changes. Your cardiac enzymes were negative x1. You were monitored on telemetry without any acute events. Throughout your hospital stay, you did not have any further episodes of weakness. Your neurological exam remained normal.   Per Neurology recommendations, you were started on Atorvastatin 40mg. Your lipid panel was within normal limits. You will continue taking this medication at home, one tablet once daily. Starting on September 13th, 2021, you may begin taking one 81mg tablet of aspirin daily for prevention. You are to follow up with Neurology and your PCP within 1-2 weeks for further management.      SECONDARY DISCHARGE DIAGNOSES  Diagnosis: Hypertension  Assessment and Plan of Treatment: You have a history of hypertension for which you are not currently taking any medications at home. Your blood pressure was mildly elevated throughout your hospital stay, but did not require medications. Please follow up with your primary care doctor within 1-2 weeks to ensure your blood pressure is appropriately managed as an outpatient.    Diagnosis: Encounter for smoking cessation counseling  Assessment and Plan of Treatment: You have been smoking 10 cigarettes a day for many years. You stated that you quit smoking 1 week ago. It is very important for your health that you do not smoke any more cigarettes. You were provided with resources to assist in quitting.    Diagnosis: Impaired ambulation  Assessment and Plan of Treatment: You were found to have difficulty walking during your hospital stay. This is possibly a result of the stroke that you had prior to your presentation to the hospital. You were evaluated by physical therapy, who recommended you be discharged home with home PT and a rolling walker. Case Management acquired a rolling walker for you. You are to follow up with Neurology and your PCP within 1-2 weeks for further management.     PRINCIPAL DISCHARGE DIAGNOSIS  Diagnosis: Stroke of right basal ganglia  Assessment and Plan of Treatment: You presented to the hospital approximately 1 week after an episode of left-sided weakness while you were in [facility]. You went to a clinic at that time and were told it was likely a Transient Ischemic Attack (TIA) or a Stroke, however no imaging was performed at that time. You were admitted to the hospital to rule out TIA versus Stroke. At the time of presentation, you were not having any symptoms and your neurological exam was normal. A CT scan of your head showed a recent subacute infarct in the righ tbasal ganglia and right corona radiata with a small area of surrounding hemorrhage. Your EKG was normal sinus rhythm with nonspecific T wave changes. Your cardiac enzymes were negative x1. You were monitored on telemetry without any acute events. Throughout your hospital stay, you did not have any further episodes of weakness. Your neurological exam remained normal.   Per Neurology recommendations, you were started on Atorvastatin 40mg. Your lipid panel was within normal limits. You will continue taking this medication at home, one tablet once daily. Starting on September 13th, 2021, you begin taking one 81mg tablet of aspirin daily for life long and Clopidogrel (Plavix) 75mg once daily for 21 days. You are to follow up with Neurology and your PCP within 1-2 weeks for further management.      SECONDARY DISCHARGE DIAGNOSES  Diagnosis: Hypertension  Assessment and Plan of Treatment: You have a history of hypertension for which you are not currently taking any medications at home. Your blood pressure was mildly elevated throughout your hospital stay, but did not require medications. Please follow up with your primary care doctor within 1-2 weeks to ensure your blood pressure is appropriately managed as an outpatient.    Diagnosis: Encounter for smoking cessation counseling  Assessment and Plan of Treatment: You have been smoking 10 cigarettes a day for many years. You stated that you quit smoking 1 week ago. It is very important for your health that you do not smoke any more cigarettes. You were provided with resources to assist in quitting.

## 2021-09-08 NOTE — PROGRESS NOTE ADULT - SUBJECTIVE AND OBJECTIVE BOX
PGY-1 Progress Note discussed with attending    PLEASE CONTACT ON CALL TEAM:  - On Call Team (Please refer to [place]) FROM 5:00 PM - 8:30PM  - Nightfloat Team FROM 8:30 -7:30 AM    CHIEF COMPLAINT & BRIEF HOSPITAL COURSE:    INTERVAL HPI/OVERNIGHT EVENTS:       REVIEW OF SYSTEMS:  CONSTITUTIONAL: No fever, weight loss, or fatigue  RESPIRATORY: No cough, wheezing, chills or hemoptysis; No shortness of breath  CARDIOVASCULAR: No chest pain, palpitations, dizziness, or leg swelling  GASTROINTESTINAL: No abdominal pain. No nausea, vomiting, or hematemesis; No diarrhea or constipation. No melena or hematochezia.  GENITOURINARY: No dysuria or hematuria, urinary frequency  NEUROLOGICAL: No headaches, memory loss, loss of strength, numbness, or tremors  SKIN: No itching, burning, rashes, or lesions     Vital Signs Last 24 Hrs  T(C): 36.7 (08 Sep 2021 12:15), Max: 36.7 (07 Sep 2021 19:51)  T(F): 98 (08 Sep 2021 12:15), Max: 98.1 (07 Sep 2021 19:51)  HR: 68 (08 Sep 2021 13:13) (68 - 91)  BP: 136/60 (08 Sep 2021 13:13) (98/56 - 136/60)  BP(mean): 79 (08 Sep 2021 13:13) (71 - 79)  RR: 17 (08 Sep 2021 12:15) (16 - 18)  SpO2: 100% (08 Sep 2021 13:13) (96% - 100%)    PHYSICAL EXAMINATION:  GENERAL: NAD, well built  HEAD:  Atraumatic, Normocephalic  EYES:  conjunctiva and sclera clear  NECK: Supple, No JVD, Normal thyroid  CHEST/LUNG: Clear to auscultation. Clear to percussion bilaterally; No rales, rhonchi, wheezing, or rubs  HEART: Regular rate and rhythm; No murmurs, rubs, or gallops  ABDOMEN: Soft, Nontender, Nondistended; Bowel sounds present  NERVOUS SYSTEM:  Alert & Oriented X3,    EXTREMITIES:  2+ Peripheral Pulses, No clubbing, cyanosis, or edema  SKIN: warm dry                          14.3   8.15  )-----------( 369      ( 08 Sep 2021 07:16 )             43.5     09-08    140  |  108  |  14  ----------------------------<  86  3.9   |  24  |  0.75    Ca    9.0      08 Sep 2021 07:16    TPro  6.9  /  Alb  3.1<L>  /  TBili  0.4  /  DBili  x   /  AST  17  /  ALT  24  /  AlkPhos  82  09-08    LIVER FUNCTIONS - ( 08 Sep 2021 07:16 )  Alb: 3.1 g/dL / Pro: 6.9 g/dL / ALK PHOS: 82 U/L / ALT: 24 U/L DA / AST: 17 U/L / GGT: x                   CAPILLARY BLOOD GLUCOSE      RADIOLOGY & ADDITIONAL TESTS:                   PGY-1 Progress Note discussed with attending    PLEASE CONTACT ON CALL TEAM:  - On Call Team (Please refer to [place]) FROM 5:00 PM - 8:30PM  - Nightfloat Team FROM 8:30 -7:30 AM    CHIEF COMPLAINT AND BRIEF HOSPITAL COURSE:   The patient is a 87 y/o man Creole speaking male, with history of HTN, noncompliant with medication, just flew in from [facility] and presents here accompanied by daughter, reporting that about 1 week ago he had weakness and numbness to left side, difficulty walking, which lasted about 1-2 days and resolved spontaneously.  He was seen at a medical clinic in [facility] and was diagnosed to have a possible Stroke or TIA, however, no imaging or treatment was performed. Patient denies speech, visual, or mental status change, spasms, weakness, chest pain, headaches, diarrhea, constipation. According to patient's daughter, he is back to baseline, no more weakness is present, however, complains of slow gait and needs assistance to walk. NIHSS was 0 upon presentation. CT head revealed a recent subacute infarct in the right basal ganglia and right corona radiata with small petechial foci of hemorrhage. Mild mass effect upon the frontal horn of the right lateral ventricle. Neurology was consulted and recommended CTA of the head and neck and holding anti-platelet drugs. CTA Head and Neck revealed a stable right basal ganglia lacunar infarct with trace petechial hemorrhage, and was negative for acute vascular abnormality. PT was consulted to assess need for physical rehabilitation.     INTERVAL HPI/OVERNIGHT EVENTS: ([facility] [place]  [place] #[id number]) Telemetry reviewed, NSR with one episode of PVC, but no acute events noted overnight. Patient seen and evaluated at the bedside. He states he is feeling well and has no complaints, his presenting complaint of weakness is still resolved. Pending PT evaluation and neuro recommendations.         REVIEW OF SYSTEMS:  CONSTITUTIONAL: No fever, weight loss, or fatigue  RESPIRATORY: No cough, wheezing, chills or hemoptysis; No shortness of breath  CARDIOVASCULAR: No chest pain, palpitations, dizziness, or leg swelling  GASTROINTESTINAL: No abdominal pain. No nausea, vomiting, or hematemesis; No diarrhea or constipation. No melena or hematochezia.  GENITOURINARY: No dysuria or hematuria, urinary frequency  NEUROLOGICAL: No headaches, memory loss, loss of strength, numbness, or tremors  SKIN: No itching, burning, rashes, or lesions     Vital Signs Last 24 Hrs  T(C): 36.7 (08 Sep 2021 12:15), Max: 36.7 (07 Sep 2021 19:51)  T(F): 98 (08 Sep 2021 12:15), Max: 98.1 (07 Sep 2021 19:51)  HR: 68 (08 Sep 2021 13:13) (68 - 91)  BP: 136/60 (08 Sep 2021 13:13) (98/56 - 136/60)  BP(mean): 79 (08 Sep 2021 13:13) (71 - 79)  RR: 17 (08 Sep 2021 12:15) (16 - 18)  SpO2: 100% (08 Sep 2021 13:13) (96% - 100%)    PHYSICAL EXAM:  GENERAL: NAD  HEENT: Normocephalic;  conjunctivae and sclerae clear; moist mucous membranes;   NECK : supple  CHEST/LUNG: Clear to auscultation bilaterally with good air entry   HEART: S1 S2  regular; no murmurs, gallops or rubs  ABDOMEN: Soft, Nontender, Nondistended; Bowel sounds present  EXTREMITIES: no cyanosis; no edema; no calf tenderness  SKIN: warm and dry; no rash  NERVOUS SYSTEM:  Awake and alert; Oriented  to place, person and time ; no new deficits; CN II-XII intact, 5/5 strength x4 extremities, sensation intact x4 extremities                          14.3   8.15  )-----------( 369      ( 08 Sep 2021 07:16 )             43.5     09-08    140  |  108  |  14  ----------------------------<  86  3.9   |  24  |  0.75    Ca    9.0      08 Sep 2021 07:16    TPro  6.9  /  Alb  3.1<L>  /  TBili  0.4  /  DBili  x   /  AST  17  /  ALT  24  /  AlkPhos  82  09-08    LIVER FUNCTIONS - ( 08 Sep 2021 07:16 )  Alb: 3.1 g/dL / Pro: 6.9 g/dL / ALK PHOS: 82 U/L / ALT: 24 U/L DA / AST: 17 U/L / GGT: x                   CAPILLARY BLOOD GLUCOSE      RADIOLOGY & ADDITIONAL TESTS:                   PGY-1 Progress Note discussed with attending    PLEASE CONTACT ON CALL TEAM:  - On Call Team (Please refer to [place]) FROM 5:00 PM - 8:30PM  - Nightfloat Team FROM 8:30 -7:30 AM    CHIEF COMPLAINT AND BRIEF HOSPITAL COURSE:   The patient is a 87 y/o man Creole speaking male, with history of HTN, noncompliant with medication, just flew in from [facility] and presents here accompanied by daughter, reporting that about 1 week ago he had weakness and numbness to left side, difficulty walking, which lasted about 1-2 days and resolved spontaneously.  He was seen at a medical clinic in [facility] and was diagnosed to have a possible Stroke or TIA, however, no imaging or treatment was performed. Patient denies speech, visual, or mental status change, spasms, weakness, chest pain, headaches, diarrhea, constipation. According to patient's daughter, he is back to baseline, no more weakness is present, however, complains of slow gait and needs assistance to walk. NIHSS was 0 upon presentation. CT head revealed a recent subacute infarct in the right basal ganglia and right corona radiata with small petechial foci of hemorrhage. Mild mass effect upon the frontal horn of the right lateral ventricle. Neurology was consulted and recommended CTA of the head and neck and holding anti-platelet drugs. CTA Head and Neck revealed a stable right basal ganglia lacunar infarct with trace petechial hemorrhage, and was negative for acute vascular abnormality. PT was consulted to assess need for physical rehabilitation.     INTERVAL HPI/OVERNIGHT EVENTS: ([facility]  [place] #[id number]) Telemetry reviewed, NSR with one episode of PVC, but no acute events noted overnight. Patient seen and evaluated at the bedside. He states he is feeling well and has no complaints, his presenting complaint of weakness is still resolved. Pending PT evaluation and neuro recommendations.   Spoke to the daughter, informed her of the CTA results and plan moving forward for a safe discharge, CM is following.          REVIEW OF SYSTEMS:  CONSTITUTIONAL: No fever, weight loss, or fatigue  RESPIRATORY: No cough, wheezing, chills or hemoptysis; No shortness of breath  CARDIOVASCULAR: No chest pain, palpitations, dizziness, or leg swelling  GASTROINTESTINAL: No abdominal pain. No nausea, vomiting, or hematemesis; No diarrhea or constipation. No melena or hematochezia.  GENITOURINARY: No dysuria or hematuria, urinary frequency  NEUROLOGICAL: No headaches, memory loss, loss of strength, numbness, or tremors  SKIN: No itching, burning, rashes, or lesions     Vital Signs Last 24 Hrs  T(C): 36.7 (08 Sep 2021 12:15), Max: 36.7 (07 Sep 2021 19:51)  T(F): 98 (08 Sep 2021 12:15), Max: 98.1 (07 Sep 2021 19:51)  HR: 68 (08 Sep 2021 13:13) (68 - 91)  BP: 136/60 (08 Sep 2021 13:13) (98/56 - 136/60)  BP(mean): 79 (08 Sep 2021 13:13) (71 - 79)  RR: 17 (08 Sep 2021 12:15) (16 - 18)  SpO2: 100% (08 Sep 2021 13:13) (96% - 100%)    PHYSICAL EXAM:  GENERAL: NAD  HEENT: Normocephalic;  conjunctivae and sclerae clear; moist mucous membranes;   NECK : supple  CHEST/LUNG: Clear to auscultation bilaterally with good air entry   HEART: S1 S2  regular; no murmurs, gallops or rubs  ABDOMEN: Soft, Nontender, Nondistended; Bowel sounds present  EXTREMITIES: no cyanosis; no edema; no calf tenderness  SKIN: warm and dry; no rash  NERVOUS SYSTEM:  Awake and alert; Oriented  to place, person and time ; no new deficits; CN II-XII intact, 5/5 strength x4 extremities, sensation intact x4 extremities                          14.3   8.15  )-----------( 369      ( 08 Sep 2021 07:16 )             43.5     09-08    140  |  108  |  14  ----------------------------<  86  3.9   |  24  |  0.75    Ca    9.0      08 Sep 2021 07:16    TPro  6.9  /  Alb  3.1<L>  /  TBili  0.4  /  DBili  x   /  AST  17  /  ALT  24  /  AlkPhos  82  09-08    LIVER FUNCTIONS - ( 08 Sep 2021 07:16 )  Alb: 3.1 g/dL / Pro: 6.9 g/dL / ALK PHOS: 82 U/L / ALT: 24 U/L DA / AST: 17 U/L / GGT: x               RADIOLOGY & ADDITIONAL TESTS:    < from: CT Head No Cont (09.05.21 @ 23:26) >  IMPRESSION:    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 Mild mass effect upon the frontal horn of the right lateral ventricle.    Critical findings were discussed with Dr. Moss of the emergency Department at 12:11 AM on 9/6/2021.      < from: CT Angio Head w/ IV Cont (09.07.21 @ 12:35) >  Mixed attenuation changes recently described centered in the right caudate head are stable with slight associated frontal horn effacement. There is no new hemorrhage or cortical edema elsewhere or midline shift.    Examination of the intracranial circulation is negative for large vessel occlusion or aneurysm or significant stenosis. The right vertebral artery is anatomically dominant.    Examination of the cervical circulation is negative for carotid or vertebral artery stenosis, occlusion or dissection. There is a bovine arch and a retroesophageal right subclavian artery.    IMPRESSION:  Stable right basal ganglia lacunar infarct with trace petechial hemorrhage. Negative for acute vascular abnormality.

## 2021-09-08 NOTE — PHYSICAL THERAPY INITIAL EVALUATION ADULT - GENERAL OBSERVATIONS, REHAB EVAL
Pt. received sitting in chair, NAD, on telemetry, cooperative and motivated during eval.  Pt. A&O x 2 with periods of confusion but was able to follow single step commands.

## 2021-09-08 NOTE — DISCHARGE NOTE PROVIDER - NSDCMRMEDTOKEN_GEN_ALL_CORE_FT
aspirin 81 mg oral tablet: 1 tab(s) orally once a day   aspirin 81 mg oral tablet, chewable: 1 tab(s) chewed once a day   atorvastatin 40 mg oral tablet: 1 tab(s) orally once a day (at bedtime)  clopidogrel 75 mg oral tablet: 1 tab(s) orally once a day

## 2021-09-08 NOTE — DISCHARGE NOTE PROVIDER - ATTENDING COMMENTS
Patient seen and examined. No new complaints. PT recommended home with rolling walker which was delivered to the bedside. Patient to begin anti-platelet therapy on 21 (explained to daughter) given hemorrhagic conversion. Neuro exam is unchanged. Cardiac exam with normal S1S2, RRR and lungs CTAB. MEdically stable for dc home.    TIME SPENT ON DISCHARGE PLANNINmins Patient seen and examined. No new complaints. PT recommended home with rolling walker which was delivered to the bedside. Patient to begin anti-platelet therapy (aspirin) on 21 (explained to daughter) given hemorrhagic conversion. Neuro exam is unchanged. Cardiac exam with normal S1S2, RRR and lungs CTAB. MEdically stable for dc home.    TIME SPENT ON DISCHARGE PLANNINmins

## 2021-09-08 NOTE — PROGRESS NOTE ADULT - ATTENDING COMMENTS
Patient seen and examined. Case discussed and seen with housestaff. Communicated with patient via [facility] [place] , [place], #[id number]. Patient offers no new complaints and states he is feeling well. Denies any weakness to the left-side of his body or elsewhere. On exam, CN 2-12 grossly in tact, 5/5 UE and LE strength, PT recommending rolling walker, CM working to obtain. Dc planning in AM once rolling walker has been obtained. Remaining care as above. Spoke with daughter at bedside regarding holding of anti-platelet agents through 9/13/21 per neuro recommendations.

## 2021-09-09 ENCOUNTER — TRANSCRIPTION ENCOUNTER (OUTPATIENT)
Age: 86
End: 2021-09-09

## 2021-09-09 VITALS
TEMPERATURE: 99 F | DIASTOLIC BLOOD PRESSURE: 59 MMHG | OXYGEN SATURATION: 97 % | HEART RATE: 66 BPM | SYSTOLIC BLOOD PRESSURE: 115 MMHG | RESPIRATION RATE: 17 BRPM

## 2021-09-09 DIAGNOSIS — E78.5 HYPERLIPIDEMIA, UNSPECIFIED: ICD-10-CM

## 2021-09-09 LAB
ALBUMIN SERPL ELPH-MCNC: 3.2 G/DL — LOW (ref 3.5–5)
ALP SERPL-CCNC: 87 U/L — SIGNIFICANT CHANGE UP (ref 40–120)
ALT FLD-CCNC: 24 U/L DA — SIGNIFICANT CHANGE UP (ref 10–60)
ANION GAP SERPL CALC-SCNC: 6 MMOL/L — SIGNIFICANT CHANGE UP (ref 5–17)
AST SERPL-CCNC: 17 U/L — SIGNIFICANT CHANGE UP (ref 10–40)
BILIRUB SERPL-MCNC: 0.5 MG/DL — SIGNIFICANT CHANGE UP (ref 0.2–1.2)
BUN SERPL-MCNC: 14 MG/DL — SIGNIFICANT CHANGE UP (ref 7–18)
CALCIUM SERPL-MCNC: 9.2 MG/DL — SIGNIFICANT CHANGE UP (ref 8.4–10.5)
CHLORIDE SERPL-SCNC: 107 MMOL/L — SIGNIFICANT CHANGE UP (ref 96–108)
CO2 SERPL-SCNC: 26 MMOL/L — SIGNIFICANT CHANGE UP (ref 22–31)
CREAT SERPL-MCNC: 0.73 MG/DL — SIGNIFICANT CHANGE UP (ref 0.5–1.3)
GLUCOSE BLDC GLUCOMTR-MCNC: 99 MG/DL — SIGNIFICANT CHANGE UP (ref 70–99)
GLUCOSE SERPL-MCNC: 93 MG/DL — SIGNIFICANT CHANGE UP (ref 70–99)
HCT VFR BLD CALC: 44.4 % — SIGNIFICANT CHANGE UP (ref 39–50)
HGB BLD-MCNC: 14.2 G/DL — SIGNIFICANT CHANGE UP (ref 13–17)
MCHC RBC-ENTMCNC: 29.5 PG — SIGNIFICANT CHANGE UP (ref 27–34)
MCHC RBC-ENTMCNC: 32 GM/DL — SIGNIFICANT CHANGE UP (ref 32–36)
MCV RBC AUTO: 92.3 FL — SIGNIFICANT CHANGE UP (ref 80–100)
NRBC # BLD: 0 /100 WBCS — SIGNIFICANT CHANGE UP (ref 0–0)
PLATELET # BLD AUTO: 394 K/UL — SIGNIFICANT CHANGE UP (ref 150–400)
POTASSIUM SERPL-MCNC: 4.1 MMOL/L — SIGNIFICANT CHANGE UP (ref 3.5–5.3)
POTASSIUM SERPL-SCNC: 4.1 MMOL/L — SIGNIFICANT CHANGE UP (ref 3.5–5.3)
PROT SERPL-MCNC: 6.8 G/DL — SIGNIFICANT CHANGE UP (ref 6–8.3)
RBC # BLD: 4.81 M/UL — SIGNIFICANT CHANGE UP (ref 4.2–5.8)
RBC # FLD: 12.7 % — SIGNIFICANT CHANGE UP (ref 10.3–14.5)
SODIUM SERPL-SCNC: 139 MMOL/L — SIGNIFICANT CHANGE UP (ref 135–145)
WBC # BLD: 8.18 K/UL — SIGNIFICANT CHANGE UP (ref 3.8–10.5)
WBC # FLD AUTO: 8.18 K/UL — SIGNIFICANT CHANGE UP (ref 3.8–10.5)

## 2021-09-09 PROCEDURE — 84484 ASSAY OF TROPONIN QUANT: CPT

## 2021-09-09 PROCEDURE — 93005 ELECTROCARDIOGRAM TRACING: CPT

## 2021-09-09 PROCEDURE — 82962 GLUCOSE BLOOD TEST: CPT

## 2021-09-09 PROCEDURE — 99239 HOSP IP/OBS DSCHRG MGMT >30: CPT | Mod: GC

## 2021-09-09 PROCEDURE — 83735 ASSAY OF MAGNESIUM: CPT

## 2021-09-09 PROCEDURE — 70496 CT ANGIOGRAPHY HEAD: CPT

## 2021-09-09 PROCEDURE — 83036 HEMOGLOBIN GLYCOSYLATED A1C: CPT

## 2021-09-09 PROCEDURE — 70450 CT HEAD/BRAIN W/O DYE: CPT | Mod: MA

## 2021-09-09 PROCEDURE — 84100 ASSAY OF PHOSPHORUS: CPT

## 2021-09-09 PROCEDURE — 93306 TTE W/DOPPLER COMPLETE: CPT

## 2021-09-09 PROCEDURE — 70498 CT ANGIOGRAPHY NECK: CPT

## 2021-09-09 PROCEDURE — 73030 X-RAY EXAM OF SHOULDER: CPT

## 2021-09-09 PROCEDURE — 86769 SARS-COV-2 COVID-19 ANTIBODY: CPT

## 2021-09-09 PROCEDURE — 85025 COMPLETE CBC W/AUTO DIFF WBC: CPT

## 2021-09-09 PROCEDURE — 99285 EMERGENCY DEPT VISIT HI MDM: CPT | Mod: 25

## 2021-09-09 PROCEDURE — 97162 PT EVAL MOD COMPLEX 30 MIN: CPT

## 2021-09-09 PROCEDURE — 85610 PROTHROMBIN TIME: CPT

## 2021-09-09 PROCEDURE — 71045 X-RAY EXAM CHEST 1 VIEW: CPT

## 2021-09-09 PROCEDURE — 36415 COLL VENOUS BLD VENIPUNCTURE: CPT

## 2021-09-09 PROCEDURE — 99053 MED SERV 10PM-8AM 24 HR FAC: CPT

## 2021-09-09 PROCEDURE — 80061 LIPID PANEL: CPT

## 2021-09-09 PROCEDURE — 80053 COMPREHEN METABOLIC PANEL: CPT

## 2021-09-09 PROCEDURE — 85027 COMPLETE CBC AUTOMATED: CPT

## 2021-09-09 PROCEDURE — 82306 VITAMIN D 25 HYDROXY: CPT

## 2021-09-09 PROCEDURE — 87635 SARS-COV-2 COVID-19 AMP PRB: CPT

## 2021-09-09 RX ORDER — ATORVASTATIN CALCIUM 80 MG/1
1 TABLET, FILM COATED ORAL
Qty: 30 | Refills: 0
Start: 2021-09-09 | End: 2021-10-08

## 2021-09-09 NOTE — DISCHARGE NOTE NURSING/CASE MANAGEMENT/SOCIAL WORK - NSDCPEWEB_GEN_ALL_CORE
[facility] for Tobacco Control website --- [email or web address] website --- [email or web address] No

## 2021-09-09 NOTE — DISCHARGE NOTE NURSING/CASE MANAGEMENT/SOCIAL WORK - NSDCPEEMAIL_GEN_ALL_CORE
[facility] for Tobacco Control email [email or web address]

## 2021-09-09 NOTE — DISCHARGE NOTE NURSING/CASE MANAGEMENT/SOCIAL WORK - PATIENT PORTAL LINK FT
You can access the FollowMyHealth Patient Portal offered by [facility] by registering at the following website: [email or web address]. By joining [facility]’s FollowMyHealth portal, you will also be able to view your health information using other applications (apps) compatible with our system.

## 2021-09-13 ENCOUNTER — APPOINTMENT (OUTPATIENT)
Dept: NEUROLOGY | Facility: CLINIC | Age: 86
End: 2021-09-13

## 2021-09-13 PROBLEM — Z00.00 ENCOUNTER FOR PREVENTIVE HEALTH EXAMINATION: Status: ACTIVE | Noted: 2021-09-13

## 2021-09-13 PROBLEM — I10 ESSENTIAL (PRIMARY) HYPERTENSION: Chronic | Status: ACTIVE | Noted: 2021-09-06

## 2021-09-13 RX ORDER — ASPIRIN/CALCIUM CARB/MAGNESIUM 324 MG
1 TABLET ORAL
Qty: 30 | Refills: 0
Start: 2021-09-13 | End: 2021-10-12

## 2021-09-13 RX ORDER — CLOPIDOGREL BISULFATE 75 MG/1
1 TABLET, FILM COATED ORAL
Qty: 21 | Refills: 0
Start: 2021-09-13 | End: 2021-10-03

## 2021-09-14 PROBLEM — Z00.00 ENCOUNTER FOR PREVENTIVE HEALTH EXAMINATION: Noted: 2021-09-14

## 2021-09-15 ENCOUNTER — APPOINTMENT (OUTPATIENT)
Dept: NEUROLOGY | Facility: CLINIC | Age: 86
End: 2021-09-15

## 2021-09-17 ENCOUNTER — APPOINTMENT (OUTPATIENT)
Dept: NEUROLOGY | Facility: CLINIC | Age: 86
End: 2021-09-17
Payer: MEDICARE

## 2021-09-17 VITALS
SYSTOLIC BLOOD PRESSURE: 118 MMHG | HEART RATE: 92 BPM | DIASTOLIC BLOOD PRESSURE: 77 MMHG | BODY MASS INDEX: 22.91 KG/M2 | OXYGEN SATURATION: 97 % | TEMPERATURE: 97.3 F | HEIGHT: 66.93 IN | WEIGHT: 146 LBS

## 2021-09-17 DIAGNOSIS — G40.909 OTHER SEQUELAE OF CEREBRAL INFARCTION: ICD-10-CM

## 2021-09-17 DIAGNOSIS — I63.9 CEREBRAL INFARCTION, UNSPECIFIED: ICD-10-CM

## 2021-09-17 DIAGNOSIS — I69.398 OTHER SEQUELAE OF CEREBRAL INFARCTION: ICD-10-CM

## 2021-09-17 PROCEDURE — 99215 OFFICE O/P EST HI 40 MIN: CPT

## 2021-09-17 NOTE — DISCUSSION/SUMMARY
[FreeTextEntry1] : Reviewed the CT scan of the head and CT angiogram.  He appears to have had right basal ganglia stroke with hemorrhage.  He also has evidence of previous basal ganglia and deep white matter periventricular white matter lacunar strokes.  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  An EEG will investigate seizures as sequela of stroke.  He is advised to continue aspirin and atorvastatin and drink adequate water.  He would like to go back to live in [facility] where he has a 24/7 caregiver in his own house.\par \par Because of her high risk of coronary artery disease in those who have suffered a stroke as well as because of his age and smoking history I recommended a cardiology consultation and treatments as recommended.

## 2021-09-17 NOTE — PHYSICAL EXAM
[Person] : oriented to person [Place] : oriented to place [Time] : disoriented to time [Short Term Intact] : short term memory impaired [Remote Intact] : remote memory intact [Registration Intact] : recent registration memory intact [Span Intact] : the attention span was decreased [Concentration Intact] : normal concentrating ability [Visual Intact] : visual attention was ~T not ~L decreased [Naming Objects] : no difficulty naming common objects [Repeating Phrases] : no difficulty repeating a phrase [Writing A Sentence] : difficulty writing a sentence [Fluency] : fluency intact [Comprehension] : comprehension intact [Reading] : reading intact [Cranial Nerves Optic (II)] : visual acuity intact bilaterally,  visual fields full to confrontation, pupils equal round and reactive to light [Cranial Nerves Oculomotor (III)] : extraocular motion intact [Cranial Nerves Trigeminal (V)] : facial sensation intact symmetrically [Cranial Nerves Facial (VII)] : face symmetrical [Cranial Nerves Glossopharyngeal (IX)] : tongue and palate midline [Cranial Nerves Accessory (XI - Cranial And Spinal)] : head turning and shoulder shrug symmetric [Cranial Nerves Hypoglossal (XII)] : there was no tongue deviation with protrusion [Motor Handedness Right-Handed] : the patient is right hand dominant [Paresis Pronator Drift Right-Sided] : no pronator drift on the right [Paresis Pronator Drift Left-Sided] : a left-sided pronator drift was present [Motor Strength Upper Extremities Bilaterally] : strength was normal in both upper extremities [Motor Strength Lower Extremities Bilaterally] : strength was normal in both lower extremities [FreeTextEntry1] : Resting parkinsonian tremor right hand. [Sclera] : the sclera and conjunctiva were normal [PERRL With Normal Accommodation] : pupils were equal in size, round, reactive to light, with normal accommodation [Extraocular Movements] : extraocular movements were intact [Outer Ear] : the ears and nose were normal in appearance [Both Tympanic Membranes Were Examined] : both tympanic membranes were normal [Hearing Loss Right Only] : diminished [Hearing Loss Left Only] : dimished [Neck Appearance] : the appearance of the neck was normal [Neck Cervical Mass (___cm)] : no neck mass was observed [Jugular Venous Distention Increased] : there was no jugular-venous distention [Thyroid Diffuse Enlargement] : the thyroid was not enlarged [Thyroid Nodule] : there were no palpable thyroid nodules

## 2021-09-17 NOTE — HISTORY OF PRESENT ILLNESS
[FreeTextEntry1] : 86-year-old Creole speaking gentleman who was helped in translation by his daughter.  Towards the end of August he suffered an episode of tonic spasm of his trunk followed by weakness of the left side arm and leg and difficulty walking.  The episode of tonic spasm laughs lasted for a brief moment.  Weakness of the left side lasted for a week with spontaneous improvement.  However he has been walking rather slowly since.

## 2021-09-17 NOTE — REVIEW OF SYSTEMS
[Confused or Disoriented] : confusion [Memory Lapses or Loss] : memory loss [Migraine Headache] : migraine headaches [Difficulty Walking] : difficulty walking [Fever] : no fever [Chills] : no chills [Feeling Poorly] : not feeling poorly [Feeling Tired] : not feeling tired [Sleep Disturbances] : no sleep disturbances [Anxiety] : no anxiety [Depression] : no depression [Decr. Concentrating Ability] : no decrease in concentrating ability [Facial Weakness] : no facial weakness [Arm Weakness] : no arm weakness [Hand Weakness] : no hand weakness [Leg Weakness] : no leg weakness [Numbness] : no numbness [Tingling] : no tingling [Seizures] : no convulsions [Fainting] : no fainting [Dizziness] : no dizziness [Lightheadedness] : no lightheadedness [Vertigo] : no vertigo [Inability to Walk] : able to walk [Ataxia] : no ataxia [Frequent Falls] : not falling [Limping] : not limping

## 2021-09-23 ENCOUNTER — APPOINTMENT (OUTPATIENT)
Dept: MRI IMAGING | Facility: HOSPITAL | Age: 86
End: 2021-09-23
Payer: MEDICARE

## 2021-09-23 ENCOUNTER — OUTPATIENT (OUTPATIENT)
Dept: OUTPATIENT SERVICES | Facility: HOSPITAL | Age: 86
LOS: 1 days | End: 2021-09-23
Payer: MEDICARE

## 2021-09-23 DIAGNOSIS — I63.9 CEREBRAL INFARCTION, UNSPECIFIED: ICD-10-CM

## 2021-09-23 DIAGNOSIS — I69.398 OTHER SEQUELAE OF CEREBRAL INFARCTION: ICD-10-CM

## 2021-09-23 PROCEDURE — 70551 MRI BRAIN STEM W/O DYE: CPT

## 2021-09-23 PROCEDURE — 70551 MRI BRAIN STEM W/O DYE: CPT | Mod: 26

## 2021-09-23 PROCEDURE — 95819 EEG AWAKE AND ASLEEP: CPT | Mod: 26

## 2021-09-23 PROCEDURE — 95957 EEG DIGITAL ANALYSIS: CPT

## 2021-09-23 PROCEDURE — 95819 EEG AWAKE AND ASLEEP: CPT

## 2021-09-23 NOTE — EEG REPORT - NS EEG TEXT BOX
[facility]  Comprehensive Epilepsy Center  Report of Routine EEG with Video    [facility]: [address], [place] [address], Phone [phone]  [facility]: [address] [place] [address], Phone [phone]  [place] [facility]: [address], [address], [place] [address] Phone [phone]    [facility]: [address] [place] [address], Phone [phone]  [place] [facility]: [address] [place] [address], Phone [phone]    Patient Name: AMELIA LUO    Age: 86 year  : 1935  EEG #: [id number]    Study Date: 2021     Technical Information:					  On Instrument: -  Placement and Labeling of Electrodes:  The EEG was performed utilizing 20 channels referential EEG connections (coronal over temporal over parasagittal montage) using all standard 10-20 electrode placements with EKG.  Recording was at a sampling rate of 256 samples per second per channel.  Time synchronized digital video recording was done simultaneously with EEG recording.  A low light infrared camera was used for low light recording.  Chad and seizure detection algorithms were utilized.    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    Study Interpretation:  Findings: The background was continuous and reactive. During wakefulness, the posterior dominant rhythm consisted of symmetric, 8 Hz activity, with amplitude to 30 uV, that attenuated to eye opening.     Background Slowing:  Excess irregular diffuse theta slowing.    Focal Slowing:   None were present.    Sleep Background:  Drowsiness was characterized by fragmentation, attenuation, and slowing of the background activity.    Stage II sleep transients were not recorded.    Other Non-Epileptiform Findings:  None were present.    Interictal Epileptiform Activity:   None were present.    Events:  Clinical events: None recorded.  Seizures: None recorded.    Activation Procedures:   Hyperventilation was not performed.    Photic stimulation was performed and did not elicit any abnormalities.      Artifacts:  Intermittent myogenic and movement artifacts were noted.    EEG Summary / Classification:  Abnormal EEG   - Mild generalized slowing.    EEG Impression / Clinical Correlate:  Abnormal EEG study.  Mild nonspecific diffuse or multifocal cerebral dysfunction.   No epileptiform pattern or seizure seen.    Angelo Colunga MD  Attending Physician, [facility]

## 2021-10-29 ENCOUNTER — TRANSCRIPTION ENCOUNTER (OUTPATIENT)
Age: 86
End: 2021-10-29

## 2021-12-06 ENCOUNTER — APPOINTMENT (OUTPATIENT)
Dept: CARDIOLOGY | Facility: CLINIC | Age: 86
End: 2021-12-06

## 2021-12-10 ENCOUNTER — APPOINTMENT (OUTPATIENT)
Dept: NEUROLOGY | Facility: CLINIC | Age: 86
End: 2021-12-10

## 2022-01-10 ENCOUNTER — APPOINTMENT (OUTPATIENT)
Dept: NEUROLOGY | Facility: CLINIC | Age: 87
End: 2022-01-10

## 2022-10-05 ENCOUNTER — APPOINTMENT (OUTPATIENT)
Dept: NEUROLOGY | Facility: CLINIC | Age: 87
End: 2022-10-05

## 2022-11-08 ENCOUNTER — APPOINTMENT (OUTPATIENT)
Dept: NEUROLOGY | Facility: CLINIC | Age: 87
End: 2022-11-08

## 2023-04-28 ENCOUNTER — INPATIENT (INPATIENT)
Facility: HOSPITAL | Age: 88
LOS: 9 days | Discharge: NURSING, REHAB OR LONG-TERM CARE | DRG: 25 | End: 2023-05-08
Attending: NEUROLOGICAL SURGERY | Admitting: NEUROLOGICAL SURGERY
Payer: MEDICARE

## 2023-04-28 ENCOUNTER — TRANSCRIPTION ENCOUNTER (OUTPATIENT)
Age: 88
End: 2023-04-28

## 2023-04-28 ENCOUNTER — EMERGENCY (EMERGENCY)
Facility: HOSPITAL | Age: 88
LOS: 1 days | Discharge: ANOTHER HOSPITAL | End: 2023-04-28
Attending: STUDENT IN AN ORGANIZED HEALTH CARE EDUCATION/TRAINING PROGRAM
Payer: MEDICARE

## 2023-04-28 VITALS
HEIGHT: 66 IN | HEART RATE: 67 BPM | OXYGEN SATURATION: 97 % | DIASTOLIC BLOOD PRESSURE: 72 MMHG | RESPIRATION RATE: 18 BRPM | SYSTOLIC BLOOD PRESSURE: 113 MMHG | TEMPERATURE: 98 F

## 2023-04-28 VITALS
OXYGEN SATURATION: 97 % | HEART RATE: 62 BPM | SYSTOLIC BLOOD PRESSURE: 154 MMHG | RESPIRATION RATE: 18 BRPM | TEMPERATURE: 97 F | DIASTOLIC BLOOD PRESSURE: 88 MMHG

## 2023-04-28 VITALS
HEIGHT: 66 IN | WEIGHT: 139.99 LBS | RESPIRATION RATE: 156 BRPM | OXYGEN SATURATION: 99 % | SYSTOLIC BLOOD PRESSURE: 142 MMHG | HEART RATE: 64 BPM | DIASTOLIC BLOOD PRESSURE: 59 MMHG

## 2023-04-28 DIAGNOSIS — S06.5XAA TRAUMATIC SUBDURAL HEMORRHAGE WITH LOSS OF CONSCIOUSNESS STATUS UNKNOWN, INITIAL ENCOUNTER: ICD-10-CM

## 2023-04-28 LAB
ALBUMIN SERPL ELPH-MCNC: 3.1 G/DL — LOW (ref 3.5–5)
ALBUMIN SERPL ELPH-MCNC: 4.1 G/DL — SIGNIFICANT CHANGE UP (ref 3.3–5)
ALP SERPL-CCNC: 104 U/L — SIGNIFICANT CHANGE UP (ref 40–120)
ALP SERPL-CCNC: 115 U/L — SIGNIFICANT CHANGE UP (ref 40–120)
ALT FLD-CCNC: 13 U/L — SIGNIFICANT CHANGE UP (ref 10–45)
ALT FLD-CCNC: 18 U/L DA — SIGNIFICANT CHANGE UP (ref 10–60)
ANION GAP SERPL CALC-SCNC: 10 MMOL/L — SIGNIFICANT CHANGE UP (ref 5–17)
ANION GAP SERPL CALC-SCNC: 11 MMOL/L — SIGNIFICANT CHANGE UP (ref 5–17)
ANION GAP SERPL CALC-SCNC: 4 MMOL/L — LOW (ref 5–17)
APTT BLD: 29.4 SEC — SIGNIFICANT CHANGE UP (ref 27.5–35.5)
AST SERPL-CCNC: 15 U/L — SIGNIFICANT CHANGE UP (ref 10–40)
AST SERPL-CCNC: 15 U/L — SIGNIFICANT CHANGE UP (ref 10–40)
BASE EXCESS BLDV CALC-SCNC: 2.2 MMOL/L — SIGNIFICANT CHANGE UP (ref -2–3)
BASOPHILS # BLD AUTO: 0.06 K/UL — SIGNIFICANT CHANGE UP (ref 0–0.2)
BASOPHILS # BLD AUTO: 0.07 K/UL — SIGNIFICANT CHANGE UP (ref 0–0.2)
BASOPHILS NFR BLD AUTO: 0.6 % — SIGNIFICANT CHANGE UP (ref 0–2)
BASOPHILS NFR BLD AUTO: 0.7 % — SIGNIFICANT CHANGE UP (ref 0–2)
BILIRUB SERPL-MCNC: 0.4 MG/DL — SIGNIFICANT CHANGE UP (ref 0.2–1.2)
BILIRUB SERPL-MCNC: 0.5 MG/DL — SIGNIFICANT CHANGE UP (ref 0.2–1.2)
BLD GP AB SCN SERPL QL: NEGATIVE — SIGNIFICANT CHANGE UP
BUN SERPL-MCNC: 10 MG/DL — SIGNIFICANT CHANGE UP (ref 7–23)
BUN SERPL-MCNC: 12 MG/DL — SIGNIFICANT CHANGE UP (ref 7–23)
BUN SERPL-MCNC: 13 MG/DL — SIGNIFICANT CHANGE UP (ref 7–18)
CA-I SERPL-SCNC: 1.26 MMOL/L — SIGNIFICANT CHANGE UP (ref 1.15–1.33)
CALCIUM SERPL-MCNC: 9.3 MG/DL — SIGNIFICANT CHANGE UP (ref 8.4–10.5)
CALCIUM SERPL-MCNC: 9.7 MG/DL — SIGNIFICANT CHANGE UP (ref 8.4–10.5)
CALCIUM SERPL-MCNC: 9.9 MG/DL — SIGNIFICANT CHANGE UP (ref 8.4–10.5)
CHLORIDE BLDV-SCNC: 104 MMOL/L — SIGNIFICANT CHANGE UP (ref 96–108)
CHLORIDE SERPL-SCNC: 104 MMOL/L — SIGNIFICANT CHANGE UP (ref 96–108)
CHLORIDE SERPL-SCNC: 105 MMOL/L — SIGNIFICANT CHANGE UP (ref 96–108)
CHLORIDE SERPL-SCNC: 108 MMOL/L — SIGNIFICANT CHANGE UP (ref 96–108)
CO2 BLDV-SCNC: 29 MMOL/L — HIGH (ref 22–26)
CO2 SERPL-SCNC: 23 MMOL/L — SIGNIFICANT CHANGE UP (ref 22–31)
CO2 SERPL-SCNC: 25 MMOL/L — SIGNIFICANT CHANGE UP (ref 22–31)
CO2 SERPL-SCNC: 29 MMOL/L — SIGNIFICANT CHANGE UP (ref 22–31)
CREAT SERPL-MCNC: 0.87 MG/DL — SIGNIFICANT CHANGE UP (ref 0.5–1.3)
CREAT SERPL-MCNC: 0.91 MG/DL — SIGNIFICANT CHANGE UP (ref 0.5–1.3)
CREAT SERPL-MCNC: 0.94 MG/DL — SIGNIFICANT CHANGE UP (ref 0.5–1.3)
EGFR: 79 ML/MIN/1.73M2 — SIGNIFICANT CHANGE UP
EGFR: 83 ML/MIN/1.73M2 — SIGNIFICANT CHANGE UP
EGFR: 85 ML/MIN/1.73M2 — SIGNIFICANT CHANGE UP
EOSINOPHIL # BLD AUTO: 0.09 K/UL — SIGNIFICANT CHANGE UP (ref 0–0.5)
EOSINOPHIL # BLD AUTO: 0.12 K/UL — SIGNIFICANT CHANGE UP (ref 0–0.5)
EOSINOPHIL NFR BLD AUTO: 0.8 % — SIGNIFICANT CHANGE UP (ref 0–6)
EOSINOPHIL NFR BLD AUTO: 1.3 % — SIGNIFICANT CHANGE UP (ref 0–6)
GAS PNL BLDV: 134 MMOL/L — LOW (ref 136–145)
GAS PNL BLDV: SIGNIFICANT CHANGE UP
GLUCOSE BLDV-MCNC: 96 MG/DL — SIGNIFICANT CHANGE UP (ref 70–99)
GLUCOSE SERPL-MCNC: 127 MG/DL — HIGH (ref 70–99)
GLUCOSE SERPL-MCNC: 91 MG/DL — SIGNIFICANT CHANGE UP (ref 70–99)
GLUCOSE SERPL-MCNC: 99 MG/DL — SIGNIFICANT CHANGE UP (ref 70–99)
HCO3 BLDV-SCNC: 28 MMOL/L — SIGNIFICANT CHANGE UP (ref 22–29)
HCT VFR BLD CALC: 40.8 % — SIGNIFICANT CHANGE UP (ref 39–50)
HCT VFR BLD CALC: 42 % — SIGNIFICANT CHANGE UP (ref 39–50)
HCT VFR BLD CALC: 43.1 % — SIGNIFICANT CHANGE UP (ref 39–50)
HCT VFR BLDA CALC: 40 % — SIGNIFICANT CHANGE UP (ref 39–51)
HEPARINASE TEG R TIME: 5.2 MIN — SIGNIFICANT CHANGE UP (ref 4.3–8.3)
HGB BLD CALC-MCNC: 13.3 G/DL — SIGNIFICANT CHANGE UP (ref 12.6–17.4)
HGB BLD-MCNC: 13.1 G/DL — SIGNIFICANT CHANGE UP (ref 13–17)
HGB BLD-MCNC: 13.3 G/DL — SIGNIFICANT CHANGE UP (ref 13–17)
HGB BLD-MCNC: 13.9 G/DL — SIGNIFICANT CHANGE UP (ref 13–17)
HOROWITZ INDEX BLDV+IHG-RTO: 21 — SIGNIFICANT CHANGE UP
IMM GRANULOCYTES NFR BLD AUTO: 0.3 % — SIGNIFICANT CHANGE UP (ref 0–0.9)
IMM GRANULOCYTES NFR BLD AUTO: 1.1 % — HIGH (ref 0–0.9)
INR BLD: 1.03 RATIO — SIGNIFICANT CHANGE UP (ref 0.88–1.16)
LACTATE BLDV-MCNC: 1.2 MMOL/L — SIGNIFICANT CHANGE UP (ref 0.5–2)
LIDOCAIN IGE QN: 92 U/L — SIGNIFICANT CHANGE UP (ref 73–393)
LYMPHOCYTES # BLD AUTO: 1.24 K/UL — SIGNIFICANT CHANGE UP (ref 1–3.3)
LYMPHOCYTES # BLD AUTO: 13.5 % — SIGNIFICANT CHANGE UP (ref 13–44)
LYMPHOCYTES # BLD AUTO: 2.66 K/UL — SIGNIFICANT CHANGE UP (ref 1–3.3)
LYMPHOCYTES # BLD AUTO: 23.1 % — SIGNIFICANT CHANGE UP (ref 13–44)
MAGNESIUM SERPL-MCNC: 2.1 MG/DL — SIGNIFICANT CHANGE UP (ref 1.6–2.6)
MAGNESIUM SERPL-MCNC: 2.3 MG/DL — SIGNIFICANT CHANGE UP (ref 1.6–2.6)
MCHC RBC-ENTMCNC: 28.7 PG — SIGNIFICANT CHANGE UP (ref 27–34)
MCHC RBC-ENTMCNC: 28.8 PG — SIGNIFICANT CHANGE UP (ref 27–34)
MCHC RBC-ENTMCNC: 29.3 PG — SIGNIFICANT CHANGE UP (ref 27–34)
MCHC RBC-ENTMCNC: 31.7 GM/DL — LOW (ref 32–36)
MCHC RBC-ENTMCNC: 32.1 GM/DL — SIGNIFICANT CHANGE UP (ref 32–36)
MCHC RBC-ENTMCNC: 32.3 GM/DL — SIGNIFICANT CHANGE UP (ref 32–36)
MCV RBC AUTO: 89.2 FL — SIGNIFICANT CHANGE UP (ref 80–100)
MCV RBC AUTO: 90.5 FL — SIGNIFICANT CHANGE UP (ref 80–100)
MCV RBC AUTO: 91.3 FL — SIGNIFICANT CHANGE UP (ref 80–100)
MONOCYTES # BLD AUTO: 0.71 K/UL — SIGNIFICANT CHANGE UP (ref 0–0.9)
MONOCYTES # BLD AUTO: 0.81 K/UL — SIGNIFICANT CHANGE UP (ref 0–0.9)
MONOCYTES NFR BLD AUTO: 7 % — SIGNIFICANT CHANGE UP (ref 2–14)
MONOCYTES NFR BLD AUTO: 7.8 % — SIGNIFICANT CHANGE UP (ref 2–14)
NEUTROPHILS # BLD AUTO: 6.93 K/UL — SIGNIFICANT CHANGE UP (ref 1.8–7.4)
NEUTROPHILS # BLD AUTO: 7.84 K/UL — HIGH (ref 1.8–7.4)
NEUTROPHILS NFR BLD AUTO: 68.2 % — SIGNIFICANT CHANGE UP (ref 43–77)
NEUTROPHILS NFR BLD AUTO: 75.6 % — SIGNIFICANT CHANGE UP (ref 43–77)
NRBC # BLD: 0 /100 WBCS — SIGNIFICANT CHANGE UP (ref 0–0)
NT-PROBNP SERPL-SCNC: 93 PG/ML — SIGNIFICANT CHANGE UP (ref 0–450)
PA ADP PRP-ACNC: 257 PRU — SIGNIFICANT CHANGE UP (ref 194–417)
PCO2 BLDV: 46 MMHG — SIGNIFICANT CHANGE UP (ref 42–55)
PH BLDV: 7.39 — SIGNIFICANT CHANGE UP (ref 7.32–7.43)
PHOSPHATE SERPL-MCNC: 2.6 MG/DL — SIGNIFICANT CHANGE UP (ref 2.5–4.5)
PHOSPHATE SERPL-MCNC: 3 MG/DL — SIGNIFICANT CHANGE UP (ref 2.5–4.5)
PLATELET # BLD AUTO: 312 K/UL — SIGNIFICANT CHANGE UP (ref 150–400)
PLATELET # BLD AUTO: 336 K/UL — SIGNIFICANT CHANGE UP (ref 150–400)
PLATELET # BLD AUTO: 343 K/UL — SIGNIFICANT CHANGE UP (ref 150–400)
PLATELET MAPPING ACTF MAX AMPLITUDE: 19.4 MM (ref 2–19)
PLATELET MAPPING ADP MAXIMUM AMPLITUDE: 66.5 MM — SIGNIFICANT CHANGE UP (ref 45–69)
PLATELET MAPPING ADP PERCENT INHIBITION: 4.5 % — SIGNIFICANT CHANGE UP (ref 0–17)
PLATELET MAPPING HKH MAXIMUM AMPLITUDE: 68.7 MM (ref 53–68)
PLATELET RESPONSE ASPIRIN RESULT: 639 ARU — SIGNIFICANT CHANGE UP (ref 350–700)
PO2 BLDV: 32 MMHG — SIGNIFICANT CHANGE UP (ref 25–45)
POTASSIUM BLDV-SCNC: 3.9 MMOL/L — SIGNIFICANT CHANGE UP (ref 3.5–5.1)
POTASSIUM SERPL-MCNC: 4.4 MMOL/L — SIGNIFICANT CHANGE UP (ref 3.5–5.3)
POTASSIUM SERPL-MCNC: 4.4 MMOL/L — SIGNIFICANT CHANGE UP (ref 3.5–5.3)
POTASSIUM SERPL-MCNC: 4.7 MMOL/L — SIGNIFICANT CHANGE UP (ref 3.5–5.3)
POTASSIUM SERPL-SCNC: 4.4 MMOL/L — SIGNIFICANT CHANGE UP (ref 3.5–5.3)
POTASSIUM SERPL-SCNC: 4.4 MMOL/L — SIGNIFICANT CHANGE UP (ref 3.5–5.3)
POTASSIUM SERPL-SCNC: 4.7 MMOL/L — SIGNIFICANT CHANGE UP (ref 3.5–5.3)
PROT SERPL-MCNC: 7.2 G/DL — SIGNIFICANT CHANGE UP (ref 6–8.3)
PROT SERPL-MCNC: 7.3 G/DL — SIGNIFICANT CHANGE UP (ref 6–8.3)
PROTHROM AB SERPL-ACNC: 11.9 SEC — SIGNIFICANT CHANGE UP (ref 10.5–13.4)
RAPIDTEG MAXIMUM AMPLITUDE: 68.7 MM — SIGNIFICANT CHANGE UP (ref 52–70)
RBC # BLD: 4.47 M/UL — SIGNIFICANT CHANGE UP (ref 4.2–5.8)
RBC # BLD: 4.64 M/UL — SIGNIFICANT CHANGE UP (ref 4.2–5.8)
RBC # BLD: 4.83 M/UL — SIGNIFICANT CHANGE UP (ref 4.2–5.8)
RBC # FLD: 13.9 % — SIGNIFICANT CHANGE UP (ref 10.3–14.5)
RBC # FLD: 13.9 % — SIGNIFICANT CHANGE UP (ref 10.3–14.5)
RBC # FLD: 14 % — SIGNIFICANT CHANGE UP (ref 10.3–14.5)
RH IG SCN BLD-IMP: POSITIVE — SIGNIFICANT CHANGE UP
SAO2 % BLDV: 50.4 % — LOW (ref 67–88)
SODIUM SERPL-SCNC: 138 MMOL/L — SIGNIFICANT CHANGE UP (ref 135–145)
SODIUM SERPL-SCNC: 140 MMOL/L — SIGNIFICANT CHANGE UP (ref 135–145)
SODIUM SERPL-SCNC: 141 MMOL/L — SIGNIFICANT CHANGE UP (ref 135–145)
TEG FUNCTIONAL FIBRINOGEN: 35.7 MM (ref 15–32)
TEG MAXIMUM AMPLITUDE: 68.6 MM — SIGNIFICANT CHANGE UP (ref 52–69)
TEG REACTION TIME: 4.7 MIN — SIGNIFICANT CHANGE UP (ref 4.6–9.1)
TROPONIN I, HIGH SENSITIVITY RESULT: 12.4 NG/L — SIGNIFICANT CHANGE UP
WBC # BLD: 10.23 K/UL — SIGNIFICANT CHANGE UP (ref 3.8–10.5)
WBC # BLD: 11.51 K/UL — HIGH (ref 3.8–10.5)
WBC # BLD: 9.16 K/UL — SIGNIFICANT CHANGE UP (ref 3.8–10.5)
WBC # FLD AUTO: 10.23 K/UL — SIGNIFICANT CHANGE UP (ref 3.8–10.5)
WBC # FLD AUTO: 11.51 K/UL — HIGH (ref 3.8–10.5)
WBC # FLD AUTO: 9.16 K/UL — SIGNIFICANT CHANGE UP (ref 3.8–10.5)

## 2023-04-28 PROCEDURE — 73060 X-RAY EXAM OF HUMERUS: CPT

## 2023-04-28 PROCEDURE — 93005 ELECTROCARDIOGRAM TRACING: CPT

## 2023-04-28 PROCEDURE — 70496 CT ANGIOGRAPHY HEAD: CPT | Mod: 26,MA

## 2023-04-28 PROCEDURE — 70498 CT ANGIOGRAPHY NECK: CPT | Mod: 26,MA

## 2023-04-28 PROCEDURE — 99291 CRITICAL CARE FIRST HOUR: CPT

## 2023-04-28 PROCEDURE — 96375 TX/PRO/DX INJ NEW DRUG ADDON: CPT | Mod: XU

## 2023-04-28 PROCEDURE — 83690 ASSAY OF LIPASE: CPT

## 2023-04-28 PROCEDURE — 36410 VNPNXR 3YR/> PHY/QHP DX/THER: CPT

## 2023-04-28 PROCEDURE — 73060 X-RAY EXAM OF HUMERUS: CPT | Mod: 26,LT

## 2023-04-28 PROCEDURE — 84100 ASSAY OF PHOSPHORUS: CPT

## 2023-04-28 PROCEDURE — 96374 THER/PROPH/DIAG INJ IV PUSH: CPT | Mod: XU

## 2023-04-28 PROCEDURE — 70450 CT HEAD/BRAIN W/O DYE: CPT | Mod: 26

## 2023-04-28 PROCEDURE — 71045 X-RAY EXAM CHEST 1 VIEW: CPT

## 2023-04-28 PROCEDURE — 83880 ASSAY OF NATRIURETIC PEPTIDE: CPT

## 2023-04-28 PROCEDURE — 70498 CT ANGIOGRAPHY NECK: CPT | Mod: MA

## 2023-04-28 PROCEDURE — 82962 GLUCOSE BLOOD TEST: CPT

## 2023-04-28 PROCEDURE — 36415 COLL VENOUS BLD VENIPUNCTURE: CPT

## 2023-04-28 PROCEDURE — 80053 COMPREHEN METABOLIC PANEL: CPT

## 2023-04-28 PROCEDURE — 70496 CT ANGIOGRAPHY HEAD: CPT | Mod: MA

## 2023-04-28 PROCEDURE — 85025 COMPLETE CBC W/AUTO DIFF WBC: CPT

## 2023-04-28 PROCEDURE — 76937 US GUIDE VASCULAR ACCESS: CPT | Mod: 26

## 2023-04-28 PROCEDURE — 83735 ASSAY OF MAGNESIUM: CPT

## 2023-04-28 PROCEDURE — 84484 ASSAY OF TROPONIN QUANT: CPT

## 2023-04-28 PROCEDURE — 99285 EMERGENCY DEPT VISIT HI MDM: CPT | Mod: 25

## 2023-04-28 PROCEDURE — 71045 X-RAY EXAM CHEST 1 VIEW: CPT | Mod: 26

## 2023-04-28 PROCEDURE — 99292 CRITICAL CARE ADDL 30 MIN: CPT

## 2023-04-28 RX ORDER — SENNA PLUS 8.6 MG/1
2 TABLET ORAL AT BEDTIME
Refills: 0 | Status: DISCONTINUED | OUTPATIENT
Start: 2023-04-28 | End: 2023-04-29

## 2023-04-28 RX ORDER — LEVETIRACETAM 250 MG/1
500 TABLET, FILM COATED ORAL ONCE
Refills: 0 | Status: COMPLETED | OUTPATIENT
Start: 2023-04-28 | End: 2023-04-28

## 2023-04-28 RX ORDER — PANTOPRAZOLE SODIUM 20 MG/1
40 TABLET, DELAYED RELEASE ORAL
Refills: 0 | Status: DISCONTINUED | OUTPATIENT
Start: 2023-04-28 | End: 2023-04-29

## 2023-04-28 RX ORDER — ACETAMINOPHEN 500 MG
650 TABLET ORAL ONCE
Refills: 0 | Status: COMPLETED | OUTPATIENT
Start: 2023-04-28 | End: 2023-04-28

## 2023-04-28 RX ORDER — ONDANSETRON 8 MG/1
4 TABLET, FILM COATED ORAL EVERY 6 HOURS
Refills: 0 | Status: DISCONTINUED | OUTPATIENT
Start: 2023-04-28 | End: 2023-04-29

## 2023-04-28 RX ORDER — SODIUM CHLORIDE 9 MG/ML
1000 INJECTION, SOLUTION INTRAVENOUS
Refills: 0 | Status: DISCONTINUED | OUTPATIENT
Start: 2023-04-28 | End: 2023-04-29

## 2023-04-28 RX ORDER — POLYETHYLENE GLYCOL 3350 17 G/17G
17 POWDER, FOR SOLUTION ORAL DAILY
Refills: 0 | Status: DISCONTINUED | OUTPATIENT
Start: 2023-04-28 | End: 2023-04-29

## 2023-04-28 RX ORDER — SODIUM CHLORIDE 9 MG/ML
500 INJECTION INTRAMUSCULAR; INTRAVENOUS; SUBCUTANEOUS ONCE
Refills: 0 | Status: COMPLETED | OUTPATIENT
Start: 2023-04-28 | End: 2023-04-28

## 2023-04-28 RX ORDER — LEVETIRACETAM 250 MG/1
500 TABLET, FILM COATED ORAL EVERY 12 HOURS
Refills: 0 | Status: DISCONTINUED | OUTPATIENT
Start: 2023-04-28 | End: 2023-04-28

## 2023-04-28 RX ORDER — OXYCODONE HYDROCHLORIDE 5 MG/1
5 TABLET ORAL EVERY 4 HOURS
Refills: 0 | Status: DISCONTINUED | OUTPATIENT
Start: 2023-04-28 | End: 2023-04-29

## 2023-04-28 RX ORDER — LEVETIRACETAM 250 MG/1
500 TABLET, FILM COATED ORAL
Refills: 0 | Status: DISCONTINUED | OUTPATIENT
Start: 2023-04-28 | End: 2023-04-29

## 2023-04-28 RX ORDER — CHLORHEXIDINE GLUCONATE 213 G/1000ML
1 SOLUTION TOPICAL DAILY
Refills: 0 | Status: DISCONTINUED | OUTPATIENT
Start: 2023-04-28 | End: 2023-04-29

## 2023-04-28 RX ORDER — ACETAMINOPHEN 500 MG
650 TABLET ORAL EVERY 6 HOURS
Refills: 0 | Status: DISCONTINUED | OUTPATIENT
Start: 2023-04-28 | End: 2023-04-29

## 2023-04-28 RX ORDER — ONDANSETRON 8 MG/1
4 TABLET, FILM COATED ORAL ONCE
Refills: 0 | Status: COMPLETED | OUTPATIENT
Start: 2023-04-28 | End: 2023-04-28

## 2023-04-28 RX ADMIN — Medication 650 MILLIGRAM(S): at 23:14

## 2023-04-28 RX ADMIN — SODIUM CHLORIDE 50 MILLILITER(S): 9 INJECTION, SOLUTION INTRAVENOUS at 22:44

## 2023-04-28 RX ADMIN — LEVETIRACETAM 420 MILLIGRAM(S): 250 TABLET, FILM COATED ORAL at 14:55

## 2023-04-28 RX ADMIN — Medication 650 MILLIGRAM(S): at 14:38

## 2023-04-28 RX ADMIN — CHLORHEXIDINE GLUCONATE 1 APPLICATION(S): 213 SOLUTION TOPICAL at 22:44

## 2023-04-28 RX ADMIN — LEVETIRACETAM 500 MILLIGRAM(S): 250 TABLET, FILM COATED ORAL at 22:43

## 2023-04-28 RX ADMIN — SODIUM CHLORIDE 500 MILLILITER(S): 9 INJECTION INTRAMUSCULAR; INTRAVENOUS; SUBCUTANEOUS at 11:08

## 2023-04-28 RX ADMIN — Medication 650 MILLIGRAM(S): at 22:44

## 2023-04-28 RX ADMIN — Medication 650 MILLIGRAM(S): at 11:08

## 2023-04-28 RX ADMIN — SENNA PLUS 2 TABLET(S): 8.6 TABLET ORAL at 22:43

## 2023-04-28 RX ADMIN — ONDANSETRON 4 MILLIGRAM(S): 8 TABLET, FILM COATED ORAL at 11:08

## 2023-04-28 NOTE — ED PROVIDER NOTE - CLINICAL SUMMARY MEDICAL DECISION MAKING FREE TEXT BOX
87-year-old male coming in after an episode of syncope and fall prior to arrival history of CVA in the past with right-sided deficits.  Right-sided deficits worsened 2 weeks ago, unchanged since then as per the daughter.  Eval for arrhythmia, ACS, head bleed, arm fracture, electrolyte abnormality.

## 2023-04-28 NOTE — PROGRESS NOTE ADULT - SUBJECTIVE AND OBJECTIVE BOX
NSCU Progress Note    Assessment/Hospital Course:        24 Hour Events/Subjective:  -       REVIEW OF SYSTEMS:  - negative except as above    VITALS:   - T(C): 36.8 (04-28-23 @ 16:59), Max: 36.8 (04-28-23 @ 16:59)  T(F): 98.2 (04-28-23 @ 16:59), Max: 98.2 (04-28-23 @ 16:59)  HR: 71 (04-28-23 @ 16:59) (64 - 71)  BP: 138/83 (04-28-23 @ 16:59) (138/83 - 142/59)  ABP: --  ABP(mean): --  RR: 20 (04-28-23 @ 16:59) (20 - 156)  SpO2: 100% (04-28-23 @ 16:59) (99% - 100%)      IMAGING/DATA:   - Reviewed          PHYSICAL EXAM:    General: calm  CVS: RRR  Pulm: CTAB  GI: Soft, NTND  Extremities: No LE Edema  Neuro: AOx3, PERRL, EOMI, facial symmetrical, fluent speech, motor 5/5 throughout, no PND, sensation in tact   NSCU Progress Note    Assessment/Hospital Course:    87M Hx HTN/HLD non-complaint with DAPT, CVA w/R sided residual weakness on DAPT but allegedly not taken last week, HTN presents after found down bathroom floor, CTH with AOC Lt SDH w/ 8mm MLS, admitted for SDH evac.      24 Hour Events/Subjective:  - admitted, plan for OR AM.      REVIEW OF SYSTEMS:  - negative except as above    VITALS:   - Reviewed      IMAGING/DATA:   - Reviewed          PHYSICAL EXAM:    General: calm  CVS: RRR  Pulm: CTAB  GI: Soft, NTND  Extremities: No LE Edema  Neuro: AOx3, PERRL, EOMI, facial symmetrical, Rt neglect, RUE 4/5 RLE 4+/5, Lt side 5/5

## 2023-04-28 NOTE — PROGRESS NOTE ADULT - ASSESSMENT
ASSESSMENT/PLAN:    ***    NEURO:  - neuro checks q1h  - repeat CTH AM  - pre-op OR AM  - Keppra 500mg BID for sz ppx  - pain control    CVS:  - SBP goal 100-160    PULM:  - ***  - IS As tolerated  - Aspiration precautions    RENAL:  - Fluids: ***  - daily IOs    GI:  - Diet: ***  - GI prophylaxis: ***  - Bowel regimen: miralax, senna    ENDO:   - FS goal 120-180    HEME/ONC:  - SCDs***  - Chemoppx: ***    ID:  - monitor for fevers      Patient is at high risk of neurologic deterioration/death due to:  ***      Time spent: 35 critical care minutes ASSESSMENT/PLAN:    AoC Lt SDH w/ 8mm L>R shift    NEURO:  hx of CVA w/ Rt residual deficits  - neuro checks q1h  - repeat CTH AM  - C-spine imaging, found down; will discuss with NSG need for C collar  - pre-op OR AM  - Keppra 500mg BID for sz ppx  - pain control  - hold home DAPT    CVS:  - SBP goal 100-180    PULM:  - RA  - IS As tolerated  - Aspiration precautions    RENAL:  - Fluids: 50cc/h NS while NPO  - daily IOs    GI:  - Diet: NPO for OR  - Bowel regimen: miralax, senna    ENDO:   - FS goal 120-180    HEME/ONC:  - SCDs  - Chemoppx: hold d/t OR AM/heme    ID:  - monitor for fevers      Patient is at high risk of neurologic deterioration/death due to:  brain compression, herniation, SDH expanison      Time spent: 35 critical care minutes

## 2023-04-28 NOTE — ED PROVIDER NOTE - ATTENDING CONTRIBUTION TO CARE
Attending MD Ayde [place]:  I personally have seen and examined this patient.  Resident note reviewed and agree on plan of care and except where noted.  See HPI, PE, and MDM for details.

## 2023-04-28 NOTE — ED ADULT NURSE NOTE - OBJECTIVE STATEMENT
Pt is an 87y M AxO3, PMH stroke 1 year ago, HTN, not on AC, presents to the ED as a transfer from [place] after an unwitnessed fall this morning. Pt primarily Vietnamese speaking, daughter ok at bedside to translate. As per daughter, family heard pt fall at 9am this morning, called 911 to get bathroom door unlocked. Once door was unlocked, pt was found to be on the floor, alert, but didn't remember falling. Family states that patient has had a decrease in bladder control x3 days and since returning from [facility] 3 weeks ago has had increased gait problems. Breathing unlabored, neuro check intact, abd soft and nontender, skin warm dry intact.  Pt denies headache, dizziness, lightheadedness, SOB, chest pain. Well appearing, speaking full sentences, maintained on cardiac monitor, VSS, no acute distress noted. Pt is an 87y M AxO3, PMH stroke 1 year ago, HTN, not on AC, presents to the ED as a transfer from [place] after an unwitnessed fall this morning, pos for subdural hematoma. Pt primarily Ukrainian speaking, daughter ok at bedside to translate. As per daughter, family heard pt fall at 9am this morning, called 911 to get bathroom door unlocked. Once door was unlocked, pt was found to be on the floor, alert, but didn't remember falling. Family states that patient has had a decrease in bladder control x3 days and since returning from [facility] weeks ago has had increased gait problems. Breathing unlabored, neuro check intact, abd soft and nontender, skin warm dry intact.  Pt denies headache, dizziness, lightheadedness, SOB, chest pain. Well appearing, speaking full sentences, maintained on cardiac monitor, VSS, no acute distress noted.

## 2023-04-28 NOTE — ED PROVIDER NOTE - CLINICAL SUMMARY MEDICAL DECISION MAKING FREE TEXT BOX
Dandre MYERS PGY-3: 87-year-old male history of hypertension, hyperlipidemia, CVA 1 year ago with no residual deficits transferred from [place] for  left-sided subdural hematoma with shift for NSgx eval. Pt well appearing, AOx3, VSS. Nonfocal neuro exam. Labs, neurosurgery consulted. Dandre MYERS PGY-3: 87-year-old male history of hypertension, hyperlipidemia, CVA 1 year ago with no residual deficits transferred from [place] for  left-sided subdural hematoma with shift for NSgx eval. Pt well appearing, AOx3, VSS. Nonfocal neuro exam. Labs, neurosurgery consulted.  Attending Ayde Colorado: 87-year-old male with history of hypertension, hyperlipidemia transferred from outside hospital for concern for subdural hematoma.  Per family patient had a fall earlier in the day.  Unclear how patient fell.  Went to outside hospital where they did a CT scan showing evidence of subdural hematoma and transferred for further evaluation.  Upon arrival patient awake and alert and following commands.  Patient moving all extremities.  Neurosurgery consulted upon arrival.  Patient is reportedly on DAPT however did not take today.  Patient is hemodynamically stable.  No further evidence of further traumatic injury on exam.  C-spine nontender to palpation and patient ranging.  Abdomen soft and nontender and patient hemodynamically stable making solid organ injury less likely.  No chest wall tenderness to palpation and patient without any evidence of hypoxia making acute rib injury less likely.  Discussed with neurosurgery and will obtain repeat CT head.  Patient will need admission for further monitoring and serial neurologic exams

## 2023-04-28 NOTE — ED ADULT NURSE NOTE - NSIMPLEMENTINTERV_GEN_ALL_ED
Implemented All Fall Risk Interventions:  [place] to call system. Call bell, personal items and telephone within reach. Instruct patient to call for assistance. Room bathroom lighting operational. Non-slip footwear when patient is off stretcher. Physically safe environment: no spills, clutter or unnecessary equipment. Stretcher in lowest position, wheels locked, appropriate side rails in place. Provide visual cue, wrist band, yellow gown, etc. Monitor gait and stability. Monitor for mental status changes and reorient to person, place, and time. Review medications for side effects contributing to fall risk. Reinforce activity limits and safety measures with patient and family.

## 2023-04-28 NOTE — H&P ADULT - ASSESSMENT
Labidou, Jeremy  87M Hx CVA w/R sided residual weakness on DAPT but allegedly not taken last week, HTN presents after found down bathroom floor, xfer from [facility] for CTH w/mixed acuity SDH w/0.8cm MLS and 2.3cm max diameter. Has been having progressive weakness and AMS last few weeks.  Exam: creole speaking, AOx3, PERRL, EOMI, R drift, mild R neglect, RUE 4 RLE 4+ L side 5/5, SILT  -preop for L callie hole v mini crani for tomorrow  -ARU/PRU, coags, CBC  -Adm NSCU under Dr. Jauregui, q1h neurochecks  -4h repeat CT Labidou, Jeremy  87M Hx HTN/HLD non-complaint with his meds, CVA w/R sided residual weakness on DAPT but allegedly not taken last week, HTN presents after found down bathroom floor, xfer from [facility] for CTH w/mixed acuity SDH w/0.8cm MLS and 2.3cm max diameter. Has been having progressive weakness and AMS last few weeks.  Exam: creole speaking, AOx3, PERRL, EOMI, R drift, mild R neglect, RUE 4 RLE 4+ L side 5/5, SILT  -preop for L callie hole v mini crani for tomorrow  -ARU/PRU, coags, CBC  -Adm NSCU under Dr. Jauregui, q1h neurochecks  -4h repeat CT

## 2023-04-28 NOTE — ED PROVIDER NOTE - PHYSICAL EXAMINATION
Nontoxic-appearing.  Clear to auscultation bilaterally.  Regular rate and rhythm.  Abdomen soft nontender.  No spinal tenderness to palpation.  No paraspinal tenderness to palpation.  Mild tenderness to palpation in the mid left humeral region.  Full range of motion in all extremities.  4 out of 5 strength in right lower extremity.  3 out of 5 strength in right upper extremity.  Positive arm drift in right upper extremity.  No facial droop noted.  No slurry speech.  Sensation intact in all extremities.

## 2023-04-28 NOTE — ED ADULT NURSE NOTE - OBJECTIVE STATEMENT
pt came in the er with c/o syncopal episode at home while using the bathroom/no acute distress noted /safety maintained /pt on cardiac monitor /family at bed side /

## 2023-04-28 NOTE — H&P ADULT - BIRTH SEX
"Hospital Medicine Daily Progress Note    Date of Service  9/23/2022    Chief Complaint  Margaret Garcia is a 85 y.o. female admitted 9/23/2022 with s/p GLF    Hospital Course  This is a 85-year-old female with past medical history of hypertension, dyslipidemia, CKD stage IIIa, paroxysmal atrial fibrillation on Eliquis, chronic hypoxic respiratory failure secondary to COPD on 2 L on exertion, hypothyroidism, ischemic bowel disease dx 2019, SHABANA not on CPAP, dementia, and chronic lower back pain who was admitted on 09/22/2022 with generalized weakness resulting in GLF-significant for SHARIFA on CKD, rhabdomyolysis and UTI.    As per prior Hospitalist: \"She was recently treated with a course of cefdinir for UTI, urine culture obtained at that time showed Klebsiella variicola with pan sensitivities intermediate sensitive to nitrofurantoin.  Due to ongoing symptoms they presented for evaluation.\"    CT head shows mild cerebral atrophy and chronic microvascular ischemic type changes no acute intracranial abnormalities.  Hip Xray negative for fractures.    Patient started on Rocephin for UTI, blood cultures and urine cultures pending.    Labs significant for rhabdomyolysis, patient started on gentle IV fluids.    Interval Problem Update  Patient noted to sustained a GLF, hip x-ray negative for any fracture.     Patient currently being treated for UTI.  Follow urine cultures.    Rhabdomyolysis is resolving, continue gentle IV fluids.    Patient evaluated by PT/OT with recommendations for home health, orders placed.    Called patient's daughter, updated on plan of care, all questions answered.    I have discussed this patient's plan of care and discharge plan at IDT rounds today with Case Management, Nursing, Nursing leadership, and other members of the IDT team.    Consultants/Specialty  None    Code Status  DNAR/DNI    Disposition  Patient is not medically cleared for discharge.   Anticipate discharge to to home with " organized home healthcare and close outpatient follow-up.  I have placed the appropriate orders for post-discharge needs.    Review of Systems  Review of Systems   Constitutional:  Positive for malaise/fatigue.   Neurological:  Positive for weakness.   All other systems reviewed and are negative.     Physical Exam  Temp:  [36.8 °C (98.3 °F)-38.5 °C (101.3 °F)] 37.7 °C (99.8 °F)  Pulse:  [] 95  Resp:  [15-20] 20  BP: (109-151)/(42-70) 109/42  SpO2:  [94 %-98 %] 98 %    Physical Exam  Vitals and nursing note reviewed.   Constitutional:       General: She is not in acute distress.     Appearance: Normal appearance.   HENT:      Head: Normocephalic and atraumatic.      Mouth/Throat:      Mouth: Mucous membranes are moist.      Pharynx: No oropharyngeal exudate.   Eyes:      Extraocular Movements: Extraocular movements intact.      Pupils: Pupils are equal, round, and reactive to light.   Cardiovascular:      Rate and Rhythm: Normal rate and regular rhythm.      Pulses: Normal pulses.      Heart sounds: No murmur heard.    No friction rub. No gallop.   Pulmonary:      Effort: Pulmonary effort is normal. No respiratory distress.      Breath sounds: No wheezing, rhonchi or rales.   Abdominal:      General: Bowel sounds are normal. There is no distension.      Palpations: Abdomen is soft. There is no mass.      Tenderness: There is no abdominal tenderness.   Musculoskeletal:         General: No swelling or tenderness. Normal range of motion.      Cervical back: Normal range of motion. No rigidity. No muscular tenderness.      Right lower leg: No edema.      Left lower leg: No edema.   Skin:     General: Skin is warm and dry.      Capillary Refill: Capillary refill takes less than 2 seconds.      Findings: No erythema or rash.   Neurological:      General: No focal deficit present.      Mental Status: She is alert and oriented to person, place, and time.      Motor: Weakness present.      Gait: Gait normal.        Fluids    Intake/Output Summary (Last 24 hours) at 9/23/2022 1720  Last data filed at 9/23/2022 1300  Gross per 24 hour   Intake 120 ml   Output 100 ml   Net 20 ml       Laboratory  Recent Labs     09/22/22  1349   WBC 9.1   RBC 3.93*   HEMOGLOBIN 12.3   HEMATOCRIT 37.5   MCV 95.4   MCH 31.3   MCHC 32.8*   RDW 49.9   PLATELETCT 308   MPV 8.8*     Recent Labs     09/22/22  1349 09/23/22  1016   SODIUM 136 134*   POTASSIUM 4.2 3.9   CHLORIDE 104 103   CO2 16* 16*   GLUCOSE 119* 90   BUN 28* 24*   CREATININE 1.80* 1.42*   CALCIUM 9.6 9.3                   Imaging  DX-HIP-BILATERAL-WITH PELVIS-2 VIEWS   Final Result      No radiographic evidence of acute traumatic injury.           Assessment/Plan  * Acute kidney injury superimposed on chronic kidney disease (HCC)- (present on admission)  Assessment & Plan  SHARIFA on CKD  Continue gentle IV fluids  Serial BMPs  Improving    Fall from ground level  Assessment & Plan  Hip xray negative for fracture    Acute cystitis without hematuria  Assessment & Plan  Follow urine culture, blood culture  Antibiotic: Ceftriaxone    Traumatic rhabdomyolysis (HCC)  Assessment & Plan  Labs significant for rhabdomyolysis, patient started on gentle IV fluids.  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 In review of yesterday's note (9/22/2022), there are no changes except as documented above.

## 2023-04-28 NOTE — ED PROVIDER NOTE - PHYSICAL EXAMINATION
Attending Ayde Colorado: Gen: NAD, heent: atrauamtic, eomi, perrla, mmm,neck; nttp,no cervical spinal ttp  no nuchal rigidity, chest: nttp, no crepitus, cv: rrr, no murmurs, lungs: ctab, abd: soft, nontender, nondistended, no peritoneal signs, no guarding, ext: wwp, neg homans, skin: no rash, neuro: awake and alert, following commands, speech clear, sensation and strength intact, no focal deficits

## 2023-04-28 NOTE — ED PROVIDER NOTE - PROGRESS NOTE DETAILS
receivd a call from the radiologist pt has a bleed. Reassesed the pt - yonis A&Ox4. As per the daughter pt is not vey talking for past couple of weeks and having decreased strength in RT extremities x2 wks. No change today from her subacute baseline mental status. Also mentions pt is having urinary incontinence for past few days. Rectal tone intact - chaperone Denies ED tech. 5/5 strength in lt lower ex and 4/5 in RT lower extremity. Called transfer center at 2p to speak with neurosurgery - Dr. Jauregui neurosurgery - wants to transfer the pateint. receivd a call from the radiologist pt has a bleed. Reassesed the pt - yonis A&Ox4. As per the daughter pt is not vey talking for past couple of weeks and having decreased strength in RT extremities x2 wks. No change today from her subacute baseline mental status. Also mentions pt is having urinary incontinence for past few days. Rectal tone intact - chaperone Denies ED tech. 5/5 strength in lt lower ex and 4/5 in RT lower extremity. Called transfer center at 2p to speak with neurosurgery - Dr. Jauregui neurosurgery - wants to transfer the pateint.  stopped asa a week ago, not on any other anticoagulation and antiplt therapy

## 2023-04-28 NOTE — H&P ADULT - HISTORY OF PRESENT ILLNESS
Abbey, Jeremy  87M Hx CVA w/R sided residual weakness on DAPT but allegedly not taken last week, HTN presents after found down bathroom floor, Jeremy Potter  87M Hx HTN/HLD non-complaint with his meds, CVA w/R sided residual weakness on DAPT but allegedly not taken last week, HTN presents after found down bathroom floor,

## 2023-04-28 NOTE — ED PROVIDER NOTE - OBJECTIVE STATEMENT
87-year-old male Creole speaking accompanied with his daughter prefers to translate past medical history of CVA right-sided deficits, hypertension presents with an episode of syncope/fall prior to arrival.  Daughter states patient was found on the floor in the bathroom.  Patient is unsure what happened-remembers feeling dizzy before the fall.  Patient had 3 episodes of vomiting after the fall.  Patient was unable to get up from the ground.  Daughter states patient had a stroke in 2021 when he developed right-sided weakness.  The right-sided weakness worsened 2 weeks ago.  PCP told him he might have had another stroke.  Daughter does not think the deficits are worsened today from what they have been for past 2 weeks.  No fevers, chills, chest pain, abdominal pain, diarrhea, urinary complaints.  Daughter states patient initially complained about arm pain but now denying pain anywhere at this time.  As per the daughter patient is answering questions appropriately but not as attentive as usual.  Not dizzy at this time.

## 2023-04-28 NOTE — ED PROVIDER NOTE - OBJECTIVE STATEMENT
87-year-old male history of hypertension, hyperlipidemia, CVA 1 year ago with no residual deficits transferred from [facility] for  left-sided subdural hematoma with shift.  Of note patient saved under different MRN [id number]. Patient accompanied by daughter at bedside providing collateral info. Daughter states patient was heard falling in bathroom 9AM today, and was brought to OSH. No LOC.  Patient is unsure what happened-remembers feeling dizzy before the fall.  Patient had 3 episodes of vomiting after the fall.  Patient has not tried ambulating since fall. Daughter states patient had a stroke in 2021 with no residual deficits. For past 3 weeks patient has been slower and weaker than usual. Pt was found to have following CT findings at OSH:  "New left frontoparietal mixed density subdural collection measuring 2.3 cm in greatest depth. Underlying sulcal effacement. Rightward midline shift of 0.9 cm. Basal cisterns are well visualized. ". Pt not on AC asides from baby aspirin. No fevers, chills, chest pain, abdominal pain, diarrhea, urinary complaints.

## 2023-04-29 ENCOUNTER — TRANSCRIPTION ENCOUNTER (OUTPATIENT)
Age: 88
End: 2023-04-29

## 2023-04-29 ENCOUNTER — APPOINTMENT (OUTPATIENT)
Dept: NEUROSURGERY | Facility: HOSPITAL | Age: 88
End: 2023-04-29

## 2023-04-29 PROCEDURE — 61312 CRNEC/CRNOT STTL XDRL/SDRL: CPT

## 2023-04-29 PROCEDURE — 99221 1ST HOSP IP/OBS SF/LOW 40: CPT | Mod: 25

## 2023-04-29 PROCEDURE — 99291 CRITICAL CARE FIRST HOUR: CPT

## 2023-04-29 PROCEDURE — 99223 1ST HOSP IP/OBS HIGH 75: CPT | Mod: 57

## 2023-04-29 PROCEDURE — 72125 CT NECK SPINE W/O DYE: CPT | Mod: 26

## 2023-04-29 PROCEDURE — 70450 CT HEAD/BRAIN W/O DYE: CPT | Mod: 26

## 2023-04-29 PROCEDURE — 52000 CYSTOURETHROSCOPY: CPT

## 2023-04-29 DEVICE — SURGIFLO MATRIX WITH THROMBIN KIT: Type: IMPLANTABLE DEVICE | Site: LEFT | Status: FUNCTIONAL

## 2023-04-29 RX ORDER — DEXMEDETOMIDINE HYDROCHLORIDE IN 0.9% SODIUM CHLORIDE 4 UG/ML
0.4 INJECTION INTRAVENOUS
Qty: 200 | Refills: 0 | Status: DISCONTINUED | OUTPATIENT
Start: 2023-04-29 | End: 2023-04-29

## 2023-04-29 RX ORDER — SENNA PLUS 8.6 MG/1
2 TABLET ORAL AT BEDTIME
Refills: 0 | Status: DISCONTINUED | OUTPATIENT
Start: 2023-04-29 | End: 2023-05-08

## 2023-04-29 RX ORDER — CEFAZOLIN SODIUM 1 G
2000 VIAL (EA) INJECTION EVERY 8 HOURS
Refills: 0 | Status: COMPLETED | OUTPATIENT
Start: 2023-04-29 | End: 2023-04-30

## 2023-04-29 RX ORDER — DEXMEDETOMIDINE HYDROCHLORIDE IN 0.9% SODIUM CHLORIDE 4 UG/ML
1 INJECTION INTRAVENOUS
Qty: 200 | Refills: 0 | Status: DISCONTINUED | OUTPATIENT
Start: 2023-04-29 | End: 2023-04-29

## 2023-04-29 RX ORDER — VALPROIC ACID (AS SODIUM SALT) 250 MG/5ML
500 SOLUTION, ORAL ORAL EVERY 8 HOURS
Refills: 0 | Status: DISCONTINUED | OUTPATIENT
Start: 2023-04-29 | End: 2023-05-01

## 2023-04-29 RX ORDER — POLYETHYLENE GLYCOL 3350 17 G/17G
17 POWDER, FOR SOLUTION ORAL DAILY
Refills: 0 | Status: DISCONTINUED | OUTPATIENT
Start: 2023-04-29 | End: 2023-04-30

## 2023-04-29 RX ORDER — ONDANSETRON 8 MG/1
4 TABLET, FILM COATED ORAL EVERY 6 HOURS
Refills: 0 | Status: DISCONTINUED | OUTPATIENT
Start: 2023-04-29 | End: 2023-05-08

## 2023-04-29 RX ORDER — ACETAMINOPHEN 500 MG
650 TABLET ORAL EVERY 6 HOURS
Refills: 0 | Status: DISCONTINUED | OUTPATIENT
Start: 2023-04-29 | End: 2023-05-08

## 2023-04-29 RX ORDER — HYDROMORPHONE HYDROCHLORIDE 2 MG/ML
0.25 INJECTION INTRAMUSCULAR; INTRAVENOUS; SUBCUTANEOUS ONCE
Refills: 0 | Status: DISCONTINUED | OUTPATIENT
Start: 2023-04-29 | End: 2023-04-29

## 2023-04-29 RX ORDER — DEXMEDETOMIDINE HYDROCHLORIDE IN 0.9% SODIUM CHLORIDE 4 UG/ML
0.3 INJECTION INTRAVENOUS
Qty: 200 | Refills: 0 | Status: DISCONTINUED | OUTPATIENT
Start: 2023-04-29 | End: 2023-04-29

## 2023-04-29 RX ORDER — OXYCODONE HYDROCHLORIDE 5 MG/1
5 TABLET ORAL EVERY 4 HOURS
Refills: 0 | Status: DISCONTINUED | OUTPATIENT
Start: 2023-04-29 | End: 2023-04-29

## 2023-04-29 RX ORDER — VALPROIC ACID (AS SODIUM SALT) 250 MG/5ML
500 SOLUTION, ORAL ORAL EVERY 8 HOURS
Refills: 0 | Status: DISCONTINUED | OUTPATIENT
Start: 2023-04-29 | End: 2023-04-29

## 2023-04-29 RX ORDER — DEXMEDETOMIDINE HYDROCHLORIDE IN 0.9% SODIUM CHLORIDE 4 UG/ML
0.3 INJECTION INTRAVENOUS
Qty: 400 | Refills: 0 | Status: DISCONTINUED | OUTPATIENT
Start: 2023-04-29 | End: 2023-04-29

## 2023-04-29 RX ORDER — SODIUM CHLORIDE 9 MG/ML
1000 INJECTION, SOLUTION INTRAVENOUS
Refills: 0 | Status: DISCONTINUED | OUTPATIENT
Start: 2023-04-29 | End: 2023-04-30

## 2023-04-29 RX ORDER — CHLORHEXIDINE GLUCONATE 213 G/1000ML
1 SOLUTION TOPICAL DAILY
Refills: 0 | Status: DISCONTINUED | OUTPATIENT
Start: 2023-04-29 | End: 2023-05-01

## 2023-04-29 RX ADMIN — HYDROMORPHONE HYDROCHLORIDE 0.25 MILLIGRAM(S): 2 INJECTION INTRAMUSCULAR; INTRAVENOUS; SUBCUTANEOUS at 04:05

## 2023-04-29 RX ADMIN — DEXMEDETOMIDINE HYDROCHLORIDE IN 0.9% SODIUM CHLORIDE 6.35 MICROGRAM(S)/KG/HR: 4 INJECTION INTRAVENOUS at 08:58

## 2023-04-29 RX ADMIN — Medication 55 MILLIGRAM(S): at 13:21

## 2023-04-29 RX ADMIN — SODIUM CHLORIDE 50 MILLILITER(S): 9 INJECTION, SOLUTION INTRAVENOUS at 07:20

## 2023-04-29 RX ADMIN — SODIUM CHLORIDE 50 MILLILITER(S): 9 INJECTION, SOLUTION INTRAVENOUS at 18:00

## 2023-04-29 RX ADMIN — HYDROMORPHONE HYDROCHLORIDE 0.25 MILLIGRAM(S): 2 INJECTION INTRAMUSCULAR; INTRAVENOUS; SUBCUTANEOUS at 04:35

## 2023-04-29 RX ADMIN — DEXMEDETOMIDINE HYDROCHLORIDE IN 0.9% SODIUM CHLORIDE 4.76 MICROGRAM(S)/KG/HR: 4 INJECTION INTRAVENOUS at 14:16

## 2023-04-29 RX ADMIN — SENNA PLUS 2 TABLET(S): 8.6 TABLET ORAL at 22:25

## 2023-04-29 RX ADMIN — Medication 55 MILLIGRAM(S): at 22:25

## 2023-04-29 RX ADMIN — Medication 100 MILLIGRAM(S): at 22:25

## 2023-04-29 RX ADMIN — Medication 55 MILLIGRAM(S): at 06:54

## 2023-04-29 NOTE — PHYSICAL THERAPY INITIAL EVALUATION ADULT - FOLLOWS COMMANDS/ANSWERS QUESTIONS, REHAB EVAL
pt is [place] and [place] creole speaking difficulty using  2/2 [place]/75% of the time/able to follow single-step instructions

## 2023-04-29 NOTE — PRE-ANESTHESIA EVALUATION ADULT - HEIGHT IN CM
(H25.13) Age-related nuclear cataract, bilateral - Assesment : Examination revealed cataract. Mild symptoms. - Plan : Monitor for changes. Advised patient of condition of cataracts and discussed symptoms of worsening and becoming more bothersome. Pt to call our office with decreased vision or increased symptoms. Updated GLRx given today. [phone]

## 2023-04-29 NOTE — PROCEDURE NOTE - ADDITIONAL PROCEDURE DETAILS
multiple attempts at mccullough prior to bedside cystoscopy. Multiple attempts with Noriega placement prior to successful bedside cystoscopy for Noriega placement. Multiple attempts at Mccullough placement prior to successful bedside cystoscopy  and placement of Mccullough over wire.   area draped and cleansed with betadine. flexible cystoscope was utilized to visualize the patients urethra and enter the bladder.   Patient noted to have a false passage around the prostatic urethra. once in the bladder, wire was advanced into the bladder under direct visualization.   scope was removed and 18f [place] was placed over wire. pink urine drained. wire was removed and mccullough balloon inflated with 10cc sterile water.     Please keep for at least 5 days prior to removal.

## 2023-04-29 NOTE — OCCUPATIONAL THERAPY INITIAL EVALUATION ADULT - LIVES WITH, PROFILE
Pt states he is visiting children from [facility], ambulates with RW prior to admission and requires assistance for ADLs

## 2023-04-29 NOTE — PROGRESS NOTE ADULT - SUBJECTIVE AND OBJECTIVE BOX
Patient seen and examined at bedside.    --Anticoagulation--    T(C): 36.7 (04-28-23 @ 19:00), Max: 36.9 (04-28-23 @ 18:15)  HR: 72 (04-29-23 @ 00:00) (59 - 83)  BP: 148/71 (04-29-23 @ 00:00) (124/71 - 148/71)  RR: 17 (04-29-23 @ 00:00) (16 - 156)  SpO2: 97% (04-29-23 @ 00:00) (97% - 100%)  Wt(kg): --    Exam: Creole-speaking, AOx3 w/ choices, no facial, RUE drift, Right side 4/5, Left side 5/5

## 2023-04-29 NOTE — PHYSICAL THERAPY INITIAL EVALUATION ADULT - GROSSLY INTACT, SENSORY
difficulty assessing sensation 2/2 Hamilton and use of , RN notified. Pt c/o numbness to R elbow/UE./Grossly Intact

## 2023-04-29 NOTE — PHYSICAL THERAPY INITIAL EVALUATION ADULT - GAIT DEVIATIONS NOTED, PT EVAL
decreased mariajose/increased time in double stance/decreased step length/decreased stride length/decreased weight-shifting ability

## 2023-04-29 NOTE — PROGRESS NOTE ADULT - SUBJECTIVE AND OBJECTIVE BOX
UROLOGY EVENT NOTE    Patient seen and examined at bedside. Called for pt in urinary retention. multiple attempts at mccullough overnight unsuccessful, bladder scan per RN >600cc and patient agitated. not consentable.  pts daughter consented for a cystoscopy at bedside for mccullough placement.      T(C): 36.6 (04-29-23 @ 07:00), Max: 36.9 (04-28-23 @ 18:15)  HR: 60 (04-29-23 @ 08:00) (59 - 83)  BP: 149/69 (04-29-23 @ 08:00) (124/71 - 172/62)  RR: 13 (04-29-23 @ 08:00) (13 - 156)  SpO2: 98% (04-29-23 @ 08:00) (96% - 100%)    Gen: NAD  Abdomen: Soft NT ND  Back: No CVAT  : blood at urethral meatus after multiple attempts at mccullough.       A/P: 88yo male no known  history, called for difficult mccullough, pt retaining >600cc, cystoscopy performed at bedside identified a false passage, mccullough placed over wire, light pink urine drained  - please keep mccullough for a minimum of 5 days to allow for urethral healing  - would recommend starting flomax to maximize chance of TOV   - would wait until improving neurologically prior to TOV  - can follow up outpatient with the [facility] of urology

## 2023-04-29 NOTE — PROGRESS NOTE ADULT - SUBJECTIVE AND OBJECTIVE BOX
HPI:  87M Hx HTN/HLD non-complaint with DAPT, CVA w/R sided residual weakness on DAPT but allegedly not taken last week, HTN presents after found down bathroom floor, CTH with AOC Lt SDH w/ 8mm MLS, admitted for SDH evac.        24 HOUR EVENTS:   - plan for or today  - attempted placement of mccullough by urology this am for retention, patient agtiated, placed on precedex            ICU Vital Signs Last 24 Hrs  T(C): 36.8 (29 Apr 2023 03:00), Max: 36.9 (28 Apr 2023 18:15)  T(F): 98.2 (29 Apr 2023 03:00), Max: 98.5 (28 Apr 2023 18:15)  HR: 61 (29 Apr 2023 06:00) (59 - 83)  BP: 161/68 (29 Apr 2023 06:00) (124/71 - 172/62)  BP(mean): 92 (29 Apr 2023 05:00) (87 - 112)  ABP: --  ABP(mean): --  RR: 14 (29 Apr 2023 06:00) (13 - 156)  SpO2: 96% (29 Apr 2023 06:00) (96% - 100%)    O2 Parameters below as of 28 Apr 2023 18:15  Patient On (Oxygen Delivery Method): room air           04-28 @ 07:01  -  04-29 @ 07:00  --------------------------------------------------------  IN: 450 mL / OUT: 0 mL / NET: 450 mL                             13.1   10.23 )-----------( 312      ( 28 Apr 2023 22:31 )             40.8    04-28    138  |  105  |  10  ----------------------------<  91  4.4   |  23  |  0.87    Ca    9.7      28 Apr 2023 22:31  Phos  3.0     04-28  Mg     2.1     04-28    TPro  7.2  /  Alb  4.1  /  TBili  0.4  /  DBili  x   /  AST  15  /  ALT  13  /  AlkPhos  115  04-28              MEDICATIONS:  acetaminophen     Tablet .. 650 milliGRAM(s) Oral every 6 hours PRN  chlorhexidine 4% Liquid 1 Application(s) Topical daily  dexMEDEtomidine Infusion 1 MICROgram(s)/kG/Hr IV Continuous <Continuous>  multiple electrolytes Injection Type 1 1000 milliLiter(s) IV Continuous <Continuous>  multivitamin 1 Tablet(s) Oral daily  ondansetron Injectable 4 milliGRAM(s) IV Push every 6 hours PRN  oxyCODONE    IR 5 milliGRAM(s) Oral every 4 hours PRN  polyethylene glycol 3350 17 Gram(s) Oral daily  senna 2 Tablet(s) Oral at bedtime  valproate sodium  IVPB 500 milliGRAM(s) IV Intermittent every 8 hours            PHYSICAL EXAM:    General: calm  CVS: RRR  Pulm: CTAB  GI: Soft, NTND  Extremities: No LE Edema  Neuro: Awake, alert, Ox3, follows, PERRL, EOMI, facial symmetrical, Rt neglect, RUE 4/5 RLE 4+/5, Lt side 5/5                 HPI:  87M Hx HTN/HLD non-complaint with DAPT, CVA w/R sided residual weakness on DAPT but allegedly not taken last week, HTN presents after found down bathroom floor, CTH with AOC Lt SDH w/ 8mm MLS, admitted for SDH evac.        24 HOUR EVENTS:   - plan for or today  - attempted placement of mccullough by urology this am for retention, patient agitated placed on precedex        T(C): 36.6 (04-29-23 @ 07:00), Max: 36.9 (04-28-23 @ 18:15)  HR: 60 (04-29-23 @ 08:00) (59 - 83)  BP: 149/69 (04-29-23 @ 08:00) (124/71 - 172/62)  RR: 13 (04-29-23 @ 08:00) (13 - 156)  SpO2: 98% (04-29-23 @ 08:00) (96% - 100%)  04-28-23 @ 07:01  -  04-29-23 @ 07:00  --------------------------------------------------------  IN: 450 mL / OUT: 0 mL / NET: 450 mL    04-29-23 @ 07:01  -  04-29-23 @ 10:35  --------------------------------------------------------  IN: 62.8 mL / OUT: 0 mL / NET: 62.8 mL    acetaminophen     Tablet .. 650 milliGRAM(s) Oral every 6 hours PRN  chlorhexidine 4% Liquid 1 Application(s) Topical daily  dexMEDEtomidine Infusion 0.4 MICROgram(s)/kG/Hr IV Continuous <Continuous>  multiple electrolytes Injection Type 1 1000 milliLiter(s) IV Continuous <Continuous>  multivitamin 1 Tablet(s) Oral daily  ondansetron Injectable 4 milliGRAM(s) IV Push every 6 hours PRN  oxyCODONE    IR 5 milliGRAM(s) Oral every 4 hours PRN  polyethylene glycol 3350 17 Gram(s) Oral daily  senna 2 Tablet(s) Oral at bedtime  valproate sodium  IVPB 500 milliGRAM(s) IV Intermittent every 8 hours          PHYSICAL EXAM:    General: calm  CVS: RRR  Pulm: CTAB  GI: Soft, NTND  Extremities: No LE Edema  Neuro: before starting precedex,  Awake, alert, Ox3, follows, PERRL, EOMI, facial symmetrical, Rt neglect, RUE 4/5 RLE 4+/5, Lt side 5/5        LABS:  Na: 138 (04-28 @ 22:31), 140 (04-28 @ 17:03)  K: 4.4 (04-28 @ 22:31), 4.7 (04-28 @ 17:03)  Cl: 105 (04-28 @ 22:31), 104 (04-28 @ 17:03)  CO2: 23 (04-28 @ 22:31), 25 (04-28 @ 17:03)  BUN: 10 (04-28 @ 22:31), 12 (04-28 @ 17:03)  Cr: 0.87 (04-28 @ 22:31), 0.91 (04-28 @ 17:03)  Glu: 91(04-28 @ 22:31), 99(04-28 @ 17:03)    Hgb: 13.1 (04-28 @ 22:31), 13.3 (04-28 @ 17:03)  Hct: 40.8 (04-28 @ 22:31), 42.0 (04-28 @ 17:03)  WBC: 10.23 (04-28 @ 22:31), 11.51 (04-28 @ 17:03)  Plt: 312 (04-28 @ 22:31), 336 (04-28 @ 17:03)    INR: 1.03 04-28-23 @ 17:03  PTT: 29.4 04-28-23 @ 17:03

## 2023-04-29 NOTE — OCCUPATIONAL THERAPY INITIAL EVALUATION ADULT - PERTINENT HX OF CURRENT PROBLEM, REHAB EVAL
87M Hx HTN/HLD non-complaint with his meds, CVA w/R sided residual weakness on DAPT but allegedly not taken last week, HTN presents after found down bathroom floor. CT Brain 4/28-Similar 9 mm left-to-right midline shift and large left frontoparietal subdural hematoma measuring 2.3 cm in thickness.

## 2023-04-29 NOTE — PHYSICAL THERAPY INITIAL EVALUATION ADULT - PERTINENT HX OF CURRENT PROBLEM, REHAB EVAL
87M Hx HTN/HLD non-complaint with his meds, CVA w/R sided residual weakness on DAPT but allegedly not taken last week, HTN presents after found down bathroom floor, transferred from [facility] for CTH w/mixed acuity SDH w/0.8cm MLS and 2.3cm max diameter. Pt has been having progressive weakness and AMS last few weeks. Per neuro exam pt AOx3, PERRL, EOMI, R drift, mild R neglect, RUE 4 RLE 4+ L side 5/5, SILT. Pt now preop for L callie hole v mini craniotomy.    4/28/2023 CT HEAD: Similar 9 mm left-to-right midline shift and large left frontoparietal subdural hematoma measuring 2.3 cm in thickness. 87M Hx HTN/HLD non-complaint with his meds, CVA w/R sided residual weakness on DAPT but allegedly not taken last week, HTN presents after found down bathroom floor, transferred from [facility] for CTH w/mixed acuity SDH w/0.8cm MLS and 2.3cm max diameter. Pt has been having progressive weakness and AMS last few weeks. Per neuro exam pt AOx3, PERRL, EOMI, R drift, mild R neglect, RUE 4 RLE 4+ L side 5/5, SILT. Pt Now s/p L Burrhole for SDH on evacuation on 4/29.   4/28/2023 CT HEAD: Similar 9 mm left-to-right midline shift and large left frontoparietal subdural hematoma measuring 2.3 cm in thickness.

## 2023-04-29 NOTE — PHYSICAL THERAPY INITIAL EVALUATION ADULT - GENERAL OBSERVATIONS, REHAB EVAL
Pt rec'd semi-supine in bed in NAD, VSS, IV, +NSCU monitoring, +subdural drain, +mccullough, agreeable to PT

## 2023-04-29 NOTE — PROGRESS NOTE ADULT - SUBJECTIVE AND OBJECTIVE BOX
Interval events:    VITALS:  T(C): , Max: 36.8 (04-28-23 @ 23:00)  HR:  (50 - 83)  BP:  (124/61 - 172/62)  ABP: --  RR:  (11 - 20)  SpO2:  (95% - 100%)  Wt(kg): --      04-28-23 @ 07:01  -  04-29-23 @ 07:00  --------------------------------------------------------  IN: 450 mL / OUT: 0 mL / NET: 450 mL    04-29-23 @ 07:01  -  04-29-23 @ 19:01  --------------------------------------------------------  IN: 623.9 mL / OUT: 1560 mL / NET: -936.1 mL      LABS:  Na: 138 (04-28 @ 22:31), 140 (04-28 @ 17:03)  K: 4.4 (04-28 @ 22:31), 4.7 (04-28 @ 17:03)  Cl: 105 (04-28 @ 22:31), 104 (04-28 @ 17:03)  CO2: 23 (04-28 @ 22:31), 25 (04-28 @ 17:03)  BUN: 10 (04-28 @ 22:31), 12 (04-28 @ 17:03)  Cr: 0.87 (04-28 @ 22:31), 0.91 (04-28 @ 17:03)  Glu: 91(04-28 @ 22:31), 99(04-28 @ 17:03)    Hgb: 13.1 (04-28 @ 22:31), 13.3 (04-28 @ 17:03)  Hct: 40.8 (04-28 @ 22:31), 42.0 (04-28 @ 17:03)  WBC: 10.23 (04-28 @ 22:31), 11.51 (04-28 @ 17:03)  Plt: 312 (04-28 @ 22:31), 336 (04-28 @ 17:03)    INR: 1.03 04-28-23 @ 17:03  PTT: 29.4 04-28-23 @ 17:03    MEDICATIONS:  acetaminophen     Tablet .. 650 milliGRAM(s) Oral every 6 hours PRN  ceFAZolin   IVPB 2000 milliGRAM(s) IV Intermittent every 8 hours  chlorhexidine 4% Liquid 1 Application(s) Topical daily  dexMEDEtomidine Infusion 0.3 MICROgram(s)/kG/Hr IV Continuous <Continuous>  multiple electrolytes Injection Type 1 1000 milliLiter(s) IV Continuous <Continuous>  multivitamin 1 Tablet(s) Oral daily  ondansetron Injectable 4 milliGRAM(s) IV Push every 6 hours PRN  oxyCODONE    IR 5 milliGRAM(s) Oral every 4 hours PRN  polyethylene glycol 3350 17 Gram(s) Oral daily  senna 2 Tablet(s) Oral at bedtime  valproate sodium  IVPB 500 milliGRAM(s) IV Intermittent every 8 hours    EXAMINATION:  General:  in NAD  HEENT:  MMM  Neuro:  awake, alert, oriented x 3, follows commands, EOMI, face symmetric, no PD, DF 5/5   Cards:  RRR  Respiratory:  no respiratory distress  Abdomen:  soft  Extremities:  no LE edema    Assessment/Plan:   86 yo man with HTN, HLD and prior stroke with residual R sided weakness, admitted 4/28 after being found down on bathroom floor, CTH with 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     Janis Hamilton  Neurocritical Care Attending   Interval events:  S/p L callie hole for evacuation today.     Patient seen on evening rounds, no acute events. Patient denies a HA.     VITALS:  T(C): , Max: 36.8 (04-28-23 @ 23:00)  HR:  (50 - 83)  BP:  (124/61 - 172/62)  ABP: --  RR:  (11 - 20)  SpO2:  (95% - 100%)  Wt(kg): --      04-28-23 @ 07:01  -  04-29-23 @ 07:00  --------------------------------------------------------  IN: 450 mL / OUT: 0 mL / NET: 450 mL    04-29-23 @ 07:01  -  04-29-23 @ 19:01  --------------------------------------------------------  IN: 623.9 mL / OUT: 1560 mL / NET: -936.1 mL      LABS:  Na: 138 (04-28 @ 22:31), 140 (04-28 @ 17:03)  K: 4.4 (04-28 @ 22:31), 4.7 (04-28 @ 17:03)  Cl: 105 (04-28 @ 22:31), 104 (04-28 @ 17:03)  CO2: 23 (04-28 @ 22:31), 25 (04-28 @ 17:03)  BUN: 10 (04-28 @ 22:31), 12 (04-28 @ 17:03)  Cr: 0.87 (04-28 @ 22:31), 0.91 (04-28 @ 17:03)  Glu: 91(04-28 @ 22:31), 99(04-28 @ 17:03)    Hgb: 13.1 (04-28 @ 22:31), 13.3 (04-28 @ 17:03)  Hct: 40.8 (04-28 @ 22:31), 42.0 (04-28 @ 17:03)  WBC: 10.23 (04-28 @ 22:31), 11.51 (04-28 @ 17:03)  Plt: 312 (04-28 @ 22:31), 336 (04-28 @ 17:03)    INR: 1.03 04-28-23 @ 17:03  PTT: 29.4 04-28-23 @ 17:03    MEDICATIONS:  acetaminophen     Tablet .. 650 milliGRAM(s) Oral every 6 hours PRN  ceFAZolin   IVPB 2000 milliGRAM(s) IV Intermittent every 8 hours  chlorhexidine 4% Liquid 1 Application(s) Topical daily  dexMEDEtomidine Infusion 0.3 MICROgram(s)/kG/Hr IV Continuous <Continuous>  multiple electrolytes Injection Type 1 1000 milliLiter(s) IV Continuous <Continuous>  multivitamin 1 Tablet(s) Oral daily  ondansetron Injectable 4 milliGRAM(s) IV Push every 6 hours PRN  oxyCODONE    IR 5 milliGRAM(s) Oral every 4 hours PRN  polyethylene glycol 3350 17 Gram(s) Oral daily  senna 2 Tablet(s) Oral at bedtime  valproate sodium  IVPB 500 milliGRAM(s) IV Intermittent every 8 hours    EXAMINATION:  General:  in NAD  HEENT:  MMM  Neuro:  awake, alert, oriented to self and month, oriented to hospital with options, follows commands, EOMI, face symmetric, with a R pronator drift, b/l LEs 5/5   Cards:  RRR  Respiratory:  no respiratory distress  Abdomen:  soft  Extremities:  no LE edema    Assessment/Plan:   88 yo man with HTN, HLD and prior stroke with residual R sided weakness, admitted 4/28 after being found down on bathroom floor, CTH with L subacute on chronic SDH with 8mm MLS, s/p L callie hole for evacuation on 4/29.     d/c Precedex  HOB flat  monitor SD SUYAPA output   VPA for seizure ppx   passed bedside swallow, start regular diet   clarify home dose of amlodipine and rosuvastatin   post-op Ancef   mccullough by urology today (notable for a false passage)  hold Lov ppx for POD 0    Janis Hamilton  Neurocritical Care Attending

## 2023-04-29 NOTE — PROGRESS NOTE ADULT - ASSESSMENT
ASSESSMENT/PLAN:    AoC Lt SDH w/ 8mm L>R shift    NEURO:  hx of CVA w/ Rt residual deficits  - neuro checks q1h  - repeat CTH AM  - C-spine imaging, found down; will discuss with NSG need for C collar  - pre-op OR  - Keppra 500mg BID for sz ppx  - pain control  - hold home DAPT    CVS:  - SBP goal 100-180    PULM:  - RA  - IS As tolerated  - Aspiration precautions    RENAL:  - Fluids: 50cc/h NS while NPO  - daily IOs    GI:  - Diet: NPO for OR  - Bowel regimen: miralax, senna    ENDO:   - FS goal 120-180    HEME/ONC:  - SCDs  - Chemoppx: hold d/t OR AM/heme    ID:  - monitor for fevers      Patient is at high risk of neurologic deterioration/death due to:  brain compression, herniation, SDH expanison      Time spent: 35 critical care minutes ASSESSMENT/PLAN:    hx of CVA w/ Rt residual deficits, presenting with Acute on Chronic  Lt SDH w brqain compression and / 8mm L>R shift    NEURO:  CT head today stable  OR today   CTA done at  no abnormality   d/c precedex now that mccullough is placed   valproic acid 500 mg q 8 hr for seizure prophylaxis for 7 days   pain control    CVS:  - SBP goal 100-160 mmhg     PULM:  - RA    RENAL:  - Fluids: plasmalyte 50 ml/hr   - daily IOs  - urinary retention, mccullough placed by urology     GI:  - Diet: NPO for OR  - Bowel regimen: miralax, senna    ENDO:   - FS goal 120-180    HEME/ONC:  - SCDs  - Chemoppx: hold d/t OR today     ID:  - monitor for fevers      Patient is at high risk of neurologic deterioration/death due to:  brain compression, herniation, SDH expanison      Time spent: 35 critical care minutes

## 2023-04-29 NOTE — PHYSICAL THERAPY INITIAL EVALUATION ADULT - ADDITIONAL COMMENTS
Pt lives in [facility] however has been living with here with his children in an apartment building. No steps to negotiate. Was ambulating with both R/W and SC prior to admission and required assist with ADLs from children.

## 2023-04-29 NOTE — PRE-ANESTHESIA EVALUATION ADULT - NSANTHPMHFT_GEN_ALL_CORE
87M Hx HTN/HLD non-complaint with DAPT, CVA w/R sided residual weakness on DAPT but allegedly not taken last week, HTN presents after found down bathroom floor, CTH with AOC Lt SDH w/ 8mm MLS, admitted for SDH evac.    Patient non-compliant with exam or ROS

## 2023-04-30 LAB
ANION GAP SERPL CALC-SCNC: 13 MMOL/L — SIGNIFICANT CHANGE UP (ref 5–17)
BUN SERPL-MCNC: 11 MG/DL — SIGNIFICANT CHANGE UP (ref 7–23)
CALCIUM SERPL-MCNC: 9.1 MG/DL — SIGNIFICANT CHANGE UP (ref 8.4–10.5)
CHLORIDE SERPL-SCNC: 105 MMOL/L — SIGNIFICANT CHANGE UP (ref 96–108)
CO2 SERPL-SCNC: 23 MMOL/L — SIGNIFICANT CHANGE UP (ref 22–31)
CREAT SERPL-MCNC: 0.83 MG/DL — SIGNIFICANT CHANGE UP (ref 0.5–1.3)
EGFR: 86 ML/MIN/1.73M2 — SIGNIFICANT CHANGE UP
GLUCOSE SERPL-MCNC: 90 MG/DL — SIGNIFICANT CHANGE UP (ref 70–99)
HCT VFR BLD CALC: 40.1 % — SIGNIFICANT CHANGE UP (ref 39–50)
HGB BLD-MCNC: 12.8 G/DL — LOW (ref 13–17)
MAGNESIUM SERPL-MCNC: 2.2 MG/DL — SIGNIFICANT CHANGE UP (ref 1.6–2.6)
MCHC RBC-ENTMCNC: 28.5 PG — SIGNIFICANT CHANGE UP (ref 27–34)
MCHC RBC-ENTMCNC: 31.9 GM/DL — LOW (ref 32–36)
MCV RBC AUTO: 89.3 FL — SIGNIFICANT CHANGE UP (ref 80–100)
NRBC # BLD: 0 /100 WBCS — SIGNIFICANT CHANGE UP (ref 0–0)
PHOSPHATE SERPL-MCNC: 3.3 MG/DL — SIGNIFICANT CHANGE UP (ref 2.5–4.5)
PLATELET # BLD AUTO: 301 K/UL — SIGNIFICANT CHANGE UP (ref 150–400)
POTASSIUM SERPL-MCNC: 4 MMOL/L — SIGNIFICANT CHANGE UP (ref 3.5–5.3)
POTASSIUM SERPL-SCNC: 4 MMOL/L — SIGNIFICANT CHANGE UP (ref 3.5–5.3)
RBC # BLD: 4.49 M/UL — SIGNIFICANT CHANGE UP (ref 4.2–5.8)
RBC # FLD: 13.7 % — SIGNIFICANT CHANGE UP (ref 10.3–14.5)
SODIUM SERPL-SCNC: 141 MMOL/L — SIGNIFICANT CHANGE UP (ref 135–145)
WBC # BLD: 10.78 K/UL — HIGH (ref 3.8–10.5)
WBC # FLD AUTO: 10.78 K/UL — HIGH (ref 3.8–10.5)

## 2023-04-30 PROCEDURE — 95720 EEG PHY/QHP EA INCR W/VEEG: CPT

## 2023-04-30 PROCEDURE — 93970 EXTREMITY STUDY: CPT | Mod: 26

## 2023-04-30 PROCEDURE — 99291 CRITICAL CARE FIRST HOUR: CPT

## 2023-04-30 PROCEDURE — 70450 CT HEAD/BRAIN W/O DYE: CPT | Mod: 26

## 2023-04-30 RX ORDER — POLYETHYLENE GLYCOL 3350 17 G/17G
17 POWDER, FOR SOLUTION ORAL EVERY 12 HOURS
Refills: 0 | Status: DISCONTINUED | OUTPATIENT
Start: 2023-04-30 | End: 2023-05-08

## 2023-04-30 RX ORDER — DEXMEDETOMIDINE HYDROCHLORIDE IN 0.9% SODIUM CHLORIDE 4 UG/ML
0.2 INJECTION INTRAVENOUS
Qty: 200 | Refills: 0 | Status: DISCONTINUED | OUTPATIENT
Start: 2023-04-30 | End: 2023-05-01

## 2023-04-30 RX ADMIN — Medication 55 MILLIGRAM(S): at 13:11

## 2023-04-30 RX ADMIN — Medication 1 TABLET(S): at 12:32

## 2023-04-30 RX ADMIN — Medication 55 MILLIGRAM(S): at 22:49

## 2023-04-30 RX ADMIN — POLYETHYLENE GLYCOL 3350 17 GRAM(S): 17 POWDER, FOR SOLUTION ORAL at 12:32

## 2023-04-30 RX ADMIN — SODIUM CHLORIDE 50 MILLILITER(S): 9 INJECTION, SOLUTION INTRAVENOUS at 07:45

## 2023-04-30 RX ADMIN — Medication 100 MILLIGRAM(S): at 06:54

## 2023-04-30 RX ADMIN — Medication 55 MILLIGRAM(S): at 06:59

## 2023-04-30 RX ADMIN — SENNA PLUS 2 TABLET(S): 8.6 TABLET ORAL at 22:50

## 2023-04-30 RX ADMIN — CHLORHEXIDINE GLUCONATE 1 APPLICATION(S): 213 SOLUTION TOPICAL at 12:32

## 2023-04-30 RX ADMIN — Medication 650 MILLIGRAM(S): at 16:00

## 2023-04-30 RX ADMIN — Medication 650 MILLIGRAM(S): at 16:30

## 2023-04-30 NOTE — PROGRESS NOTE ADULT - SUBJECTIVE AND OBJECTIVE BOX
Interval events:    VITALS:  T(C): , Max: 37.2 (04-30-23 @ 11:00)  HR:  (64 - 86)  BP:  (108/65 - 167/71)  ABP: --  RR:  (10 - 20)  SpO2:  (94% - 100%)  Wt(kg): --      04-29-23 @ 07:01  -  04-30-23 @ 07:00  --------------------------------------------------------  IN: 1423.9 mL / OUT: 2591 mL / NET: -1167.1 mL    04-30-23 @ 07:01  -  04-30-23 @ 18:54  --------------------------------------------------------  IN: 1100 mL / OUT: 970 mL / NET: 130 mL      LABS:  Na: 141 (04-30 @ 04:40), 138 (04-28 @ 22:31), 140 (04-28 @ 17:03)  K: 4.0 (04-30 @ 04:40), 4.4 (04-28 @ 22:31), 4.7 (04-28 @ 17:03)  Cl: 105 (04-30 @ 04:40), 105 (04-28 @ 22:31), 104 (04-28 @ 17:03)  CO2: 23 (04-30 @ 04:40), 23 (04-28 @ 22:31), 25 (04-28 @ 17:03)  BUN: 11 (04-30 @ 04:40), 10 (04-28 @ 22:31), 12 (04-28 @ 17:03)  Cr: 0.83 (04-30 @ 04:40), 0.87 (04-28 @ 22:31), 0.91 (04-28 @ 17:03)  Glu: 90(04-30 @ 04:40), 91(04-28 @ 22:31), 99(04-28 @ 17:03)    Hgb: 12.8 (04-30 @ 04:40), 13.1 (04-28 @ 22:31), 13.3 (04-28 @ 17:03)  Hct: 40.1 (04-30 @ 04:40), 40.8 (04-28 @ 22:31), 42.0 (04-28 @ 17:03)  WBC: 10.78 (04-30 @ 04:40), 10.23 (04-28 @ 22:31), 11.51 (04-28 @ 17:03)  Plt: 301 (04-30 @ 04:40), 312 (04-28 @ 22:31), 336 (04-28 @ 17:03)    INR: 1.03 04-28-23 @ 17:03  PTT: 29.4 04-28-23 @ 17:03    MEDICATIONS:  acetaminophen     Tablet .. 650 milliGRAM(s) Oral every 6 hours PRN  chlorhexidine 4% Liquid 1 Application(s) Topical daily  multivitamin 1 Tablet(s) Oral daily  ondansetron Injectable 4 milliGRAM(s) IV Push every 6 hours PRN  oxyCODONE    IR 5 milliGRAM(s) Oral every 4 hours PRN  polyethylene glycol 3350 17 Gram(s) Oral daily  senna 2 Tablet(s) Oral at bedtime  valproate sodium  IVPB 500 milliGRAM(s) IV Intermittent every 8 hours    EXAMINATION:  General:  in NAD  HEENT:  MMM  Neuro:  awake, alert, oriented to self and month, oriented to hospital with options, follows commands, EOMI, face symmetric, with a R pronator drift, b/l LEs 5/5   Cards:  RRR  Respiratory:  no respiratory distress  Abdomen:  soft  Extremities:  no LE edema    Assessment/Plan:   86 yo man with HTN, HLD and prior stroke with residual R sided weakness, admitted 4/28 after being found down on bathroom floor, CTH with L subacute on chronic SDH with 8mm MLS, s/p L callie hole for evacuation on 4/29.     d/c Precedex  HOB flat  monitor SD SUYAPA output   VPA for seizure ppx   passed bedside swallow, start regular diet   clarify home dose of amlodipine and rosuvastatin   post-op Ancef   mccullough by urology today (notable for a false passage)  hold Lov ppx for POD 0    Janis Hamilton  Neurocritical Care Attending   Interval events:  CTH this AM with improved size of SDH.   Started on VEEG during the day for R arm tremor.     Patient seen on evening rounds, no acute events.   SD SUYAPA with 0 output. Neurosurgery aware.     VITALS:  T(C): , Max: 37.2 (04-30-23 @ 11:00)  HR:  (64 - 86)  BP:  (108/65 - 167/71)  ABP: --  RR:  (10 - 20)  SpO2:  (94% - 100%)  Wt(kg): --      04-29-23 @ 07:01  -  04-30-23 @ 07:00  --------------------------------------------------------  IN: 1423.9 mL / OUT: 2591 mL / NET: -1167.1 mL    04-30-23 @ 07:01  -  04-30-23 @ 18:54  --------------------------------------------------------  IN: 1100 mL / OUT: 970 mL / NET: 130 mL      LABS:  Na: 141 (04-30 @ 04:40), 138 (04-28 @ 22:31), 140 (04-28 @ 17:03)  K: 4.0 (04-30 @ 04:40), 4.4 (04-28 @ 22:31), 4.7 (04-28 @ 17:03)  Cl: 105 (04-30 @ 04:40), 105 (04-28 @ 22:31), 104 (04-28 @ 17:03)  CO2: 23 (04-30 @ 04:40), 23 (04-28 @ 22:31), 25 (04-28 @ 17:03)  BUN: 11 (04-30 @ 04:40), 10 (04-28 @ 22:31), 12 (04-28 @ 17:03)  Cr: 0.83 (04-30 @ 04:40), 0.87 (04-28 @ 22:31), 0.91 (04-28 @ 17:03)  Glu: 90(04-30 @ 04:40), 91(04-28 @ 22:31), 99(04-28 @ 17:03)    Hgb: 12.8 (04-30 @ 04:40), 13.1 (04-28 @ 22:31), 13.3 (04-28 @ 17:03)  Hct: 40.1 (04-30 @ 04:40), 40.8 (04-28 @ 22:31), 42.0 (04-28 @ 17:03)  WBC: 10.78 (04-30 @ 04:40), 10.23 (04-28 @ 22:31), 11.51 (04-28 @ 17:03)  Plt: 301 (04-30 @ 04:40), 312 (04-28 @ 22:31), 336 (04-28 @ 17:03)    INR: 1.03 04-28-23 @ 17:03  PTT: 29.4 04-28-23 @ 17:03    MEDICATIONS:  acetaminophen     Tablet .. 650 milliGRAM(s) Oral every 6 hours PRN  chlorhexidine 4% Liquid 1 Application(s) Topical daily  multivitamin 1 Tablet(s) Oral daily  ondansetron Injectable 4 milliGRAM(s) IV Push every 6 hours PRN  oxyCODONE    IR 5 milliGRAM(s) Oral every 4 hours PRN  polyethylene glycol 3350 17 Gram(s) Oral daily  senna 2 Tablet(s) Oral at bedtime  valproate sodium  IVPB 500 milliGRAM(s) IV Intermittent every 8 hours    EXAMINATION:  General:  in NAD  HEENT:  MMM  Neuro:  awake, alert, oriented to self and year, not oriented to place, follows commands, PERRL, EOMI, face symmetric, with a R pronator drift, no LE drift. With a mild resting and action tremor in R arm.   Cards:  RRR  Respiratory:  no respiratory distress  Abdomen:  soft  Extremities:  no LE edema    Assessment/Plan:   86 yo man with HTN, HLD and prior stroke with residual R sided weakness, admitted 4/28 after being found down on bathroom floor, CTH with L subacute on chronic SDH with 8mm MLS, s/p L callie hole for evacuation on 4/29. Post-op CTH with improved size of SDH.     monitor SD SUYAPA output   VPA for seizure ppx   regular diet, LBM PTA, on senna and miralax increase to BID   clarify home dose of amlodipine and rosuvastatin   mccullough by urology (notable for a false passage)  start Lov ppx when safe from nrsg standpoint     Janis Hamilton  Neurocritical Care Attending

## 2023-04-30 NOTE — PROGRESS NOTE ADULT - SUBJECTIVE AND OBJECTIVE BOX
HPI:  87M Hx HTN/HLD non-complaint with DAPT, CVA w/R sided residual weakness on DAPT but allegedly not taken last week, HTN presents after found down bathroom floor, CTH with AOC Lt SDH w/ 8mm MLS, admitted for SDH evac.        24 HOUR EVENTS:   pod  1 FROM Left callie hole for evacuation of chronic SDH. No issues. 1 10F Round SD SUYAPA placed    T(C): 36.7 (04-30-23 @ 07:00), Max: 36.7 (04-29-23 @ 23:00)  HR: 71 (04-30-23 @ 07:00) (50 - 79)  BP: 156/58 (04-30-23 @ 07:00) (124/61 - 167/71)  RR: 17 (04-30-23 @ 07:00) (10 - 19)  SpO2: 99% (04-30-23 @ 07:00) (95% - 100%)  04-29-23 @ 07:01  -  04-30-23 @ 07:00  --------------------------------------------------------  IN: 1423.9 mL / OUT: 2591 mL / NET: -1167.1 mL    04-30-23 @ 07:01  -  04-30-23 @ 08:31  --------------------------------------------------------  IN: 50 mL / OUT: 90 mL / NET: -40 mL    acetaminophen     Tablet .. 650 milliGRAM(s) Oral every 6 hours PRN  chlorhexidine 4% Liquid 1 Application(s) Topical daily  multiple electrolytes Injection Type 1 1000 milliLiter(s) IV Continuous <Continuous>  multivitamin 1 Tablet(s) Oral daily  ondansetron Injectable 4 milliGRAM(s) IV Push every 6 hours PRN  oxyCODONE    IR 5 milliGRAM(s) Oral every 4 hours PRN  polyethylene glycol 3350 17 Gram(s) Oral daily  senna 2 Tablet(s) Oral at bedtime  valproate sodium  IVPB 500 milliGRAM(s) IV Intermittent every 8 hours          PHYSICAL EXAM:    General: calm  CVS: RRR  Pulm: CTAB  GI: Soft, NTND  Extremities: No LE Edema  Neuro: before starting precedex,  Awake, alert, Ox3, follows, PERRL, EOMI, facial symmetrical, Rt neglect, 5/ 5 all over         LABS:  Na: 141 (04-30 @ 04:40), 138 (04-28 @ 22:31), 140 (04-28 @ 17:03)  K: 4.0 (04-30 @ 04:40), 4.4 (04-28 @ 22:31), 4.7 (04-28 @ 17:03)  Cl: 105 (04-30 @ 04:40), 105 (04-28 @ 22:31), 104 (04-28 @ 17:03)  CO2: 23 (04-30 @ 04:40), 23 (04-28 @ 22:31), 25 (04-28 @ 17:03)  BUN: 11 (04-30 @ 04:40), 10 (04-28 @ 22:31), 12 (04-28 @ 17:03)  Cr: 0.83 (04-30 @ 04:40), 0.87 (04-28 @ 22:31), 0.91 (04-28 @ 17:03)  Glu: 90(04-30 @ 04:40), 91(04-28 @ 22:31), 99(04-28 @ 17:03)    Hgb: 12.8 (04-30 @ 04:40), 13.1 (04-28 @ 22:31), 13.3 (04-28 @ 17:03)  Hct: 40.1 (04-30 @ 04:40), 40.8 (04-28 @ 22:31), 42.0 (04-28 @ 17:03)  WBC: 10.78 (04-30 @ 04:40), 10.23 (04-28 @ 22:31), 11.51 (04-28 @ 17:03)  Plt: 301 (04-30 @ 04:40), 312 (04-28 @ 22:31), 336 (04-28 @ 17:03)    INR: 1.03 04-28-23 @ 17:03  PTT: 29.4 04-28-23 @ 17:03        ASSESSMENT/PLAN:    hx of CVA w/ Rt residual deficits, presenting with Acute on Chronic  Lt SDH w brqain compression and / 8mm L>R shift  POD 1 from Left callie hole for evacuation of chronic SDH. No issues. 1 10F Round SD SUYAPA placed  NEURO:  neuro checks q 2 hr   OR today   valproic acid 500 mg q 8 hr for seizure prophylaxis for 7 days day 2/7   pain control  PT/OT/OBC     CVS:  - SBP goal 100-160 mmhg     PULM:  - RA    RENAL:  - IVL   - daily IOs  - urinary retention, mccullough placed by urology     GI:  - CCD diet   - Bowel regimen: miralax, senna    ENDO:   - FS goal 120-180    HEME/ONC:  - SCDs  - Chemoppx: hold  till tomorrow     ID:  - monitor for fevers      Patient is at high risk of neurologic deterioration/death due to:  brain compression, herniation, SDH expanison      Time spent: 35 critical care minutes   HPI:  87M Hx HTN/HLD non-complaint with DAPT, CVA w/R sided residual weakness on DAPT but allegedly not taken last week, HTN presents after found down bathroom floor, CTH with AOC Lt SDH w/ 8mm MLS, admitted for SDH evac.        24 HOUR EVENTS:   pod  1 FROM Left callie hole for evacuation of chronic SDH. No issues. 1 10F Round SD SUYAPA placed    T(C): 36.7 (04-30-23 @ 07:00), Max: 36.7 (04-29-23 @ 23:00)  HR: 71 (04-30-23 @ 07:00) (50 - 79)  BP: 156/58 (04-30-23 @ 07:00) (124/61 - 167/71)  RR: 17 (04-30-23 @ 07:00) (10 - 19)  SpO2: 99% (04-30-23 @ 07:00) (95% - 100%)  04-29-23 @ 07:01  -  04-30-23 @ 07:00  --------------------------------------------------------  IN: 1423.9 mL / OUT: 2591 mL / NET: -1167.1 mL    04-30-23 @ 07:01  -  04-30-23 @ 08:31  --------------------------------------------------------  IN: 50 mL / OUT: 90 mL / NET: -40 mL    acetaminophen     Tablet .. 650 milliGRAM(s) Oral every 6 hours PRN  chlorhexidine 4% Liquid 1 Application(s) Topical daily  multiple electrolytes Injection Type 1 1000 milliLiter(s) IV Continuous <Continuous>  multivitamin 1 Tablet(s) Oral daily  ondansetron Injectable 4 milliGRAM(s) IV Push every 6 hours PRN  oxyCODONE    IR 5 milliGRAM(s) Oral every 4 hours PRN  polyethylene glycol 3350 17 Gram(s) Oral daily  senna 2 Tablet(s) Oral at bedtime  valproate sodium  IVPB 500 milliGRAM(s) IV Intermittent every 8 hours          PHYSICAL EXAM:    General: calm  CVS: RRR  Pulm: CTAB  GI: Soft, NTND  Extremities: No LE Edema  Neuro: Awake, alert, Ox3, follows, PERRL, EOMI, facial symmetrical, Rt neglect, 5/ 5 all over           LABS:  Na: 141 (04-30 @ 04:40), 138 (04-28 @ 22:31), 140 (04-28 @ 17:03)  K: 4.0 (04-30 @ 04:40), 4.4 (04-28 @ 22:31), 4.7 (04-28 @ 17:03)  Cl: 105 (04-30 @ 04:40), 105 (04-28 @ 22:31), 104 (04-28 @ 17:03)  CO2: 23 (04-30 @ 04:40), 23 (04-28 @ 22:31), 25 (04-28 @ 17:03)  BUN: 11 (04-30 @ 04:40), 10 (04-28 @ 22:31), 12 (04-28 @ 17:03)  Cr: 0.83 (04-30 @ 04:40), 0.87 (04-28 @ 22:31), 0.91 (04-28 @ 17:03)  Glu: 90(04-30 @ 04:40), 91(04-28 @ 22:31), 99(04-28 @ 17:03)    Hgb: 12.8 (04-30 @ 04:40), 13.1 (04-28 @ 22:31), 13.3 (04-28 @ 17:03)  Hct: 40.1 (04-30 @ 04:40), 40.8 (04-28 @ 22:31), 42.0 (04-28 @ 17:03)  WBC: 10.78 (04-30 @ 04:40), 10.23 (04-28 @ 22:31), 11.51 (04-28 @ 17:03)  Plt: 301 (04-30 @ 04:40), 312 (04-28 @ 22:31), 336 (04-28 @ 17:03)    INR: 1.03 04-28-23 @ 17:03  PTT: 29.4 04-28-23 @ 17:03                ASSESSMENT/PLAN:    hx of CVA w/ Rt residual deficits, presenting with Acute on Chronic  Lt SDH w brqain compression and / 8mm L>R shift  POD 1 from Left callie hole for evacuation of chronic SDH. No issues. 1 10F Round SD SUYAPA placed    NEURO:  neuro checks q 2 hr   CT head pending   monitor subdural drain output 45 cc/24 hr   valproic acid 500 mg q 8 hr for seizure prophylaxis for 7 days day 2/7   pain control  PT/OT/OBC     CVS:  - SBP goal 100-160 mmhg     PULM:  - RA    RENAL:  - IVL   - urinary retention, mccullough placed by urology     GI:  - regular diet   - Bowel regimen: miralax, senna  last BM 4/28     ENDO:   - FS goal 120-180    HEME/ONC:  - SCDs  - Chemoppx: hold  till tomorrow     ID:  afebrile       Patient is at high risk of neurologic deterioration/death due to:  brain compression, herniation, SDH expanison      Time spent: 35 critical care minutes   HPI:  87M Hx HTN/HLD non-complaint with DAPT, CVA w/R sided residual weakness on DAPT but allegedly not taken last week, HTN presents after found down bathroom floor, CTH with AOC Lt SDH w/ 8mm MLS, admitted for SDH evac.        24 HOUR EVENTS:   pod  1 FROM Left callie hole for evacuation of chronic SDH. No issues. 1 10F Round SD SUYAPA placed    T(C): 36.7 (04-30-23 @ 07:00), Max: 36.7 (04-29-23 @ 23:00)  HR: 71 (04-30-23 @ 07:00) (50 - 79)  BP: 156/58 (04-30-23 @ 07:00) (124/61 - 167/71)  RR: 17 (04-30-23 @ 07:00) (10 - 19)  SpO2: 99% (04-30-23 @ 07:00) (95% - 100%)  04-29-23 @ 07:01  -  04-30-23 @ 07:00  --------------------------------------------------------  IN: 1423.9 mL / OUT: 2591 mL / NET: -1167.1 mL    04-30-23 @ 07:01  -  04-30-23 @ 08:31  --------------------------------------------------------  IN: 50 mL / OUT: 90 mL / NET: -40 mL    acetaminophen     Tablet .. 650 milliGRAM(s) Oral every 6 hours PRN  chlorhexidine 4% Liquid 1 Application(s) Topical daily  multiple electrolytes Injection Type 1 1000 milliLiter(s) IV Continuous <Continuous>  multivitamin 1 Tablet(s) Oral daily  ondansetron Injectable 4 milliGRAM(s) IV Push every 6 hours PRN  oxyCODONE    IR 5 milliGRAM(s) Oral every 4 hours PRN  polyethylene glycol 3350 17 Gram(s) Oral daily  senna 2 Tablet(s) Oral at bedtime  valproate sodium  IVPB 500 milliGRAM(s) IV Intermittent every 8 hours          PHYSICAL EXAM:    General: calm  CVS: RRR  Pulm: CTAB  GI: Soft, NTND  Extremities: No LE Edema  Neuro: Awake, alert, Ox3, follows, PERRL, EOMI, facial symmetrical, Rt neglect, 5/ 5 all over           LABS:  Na: 141 (04-30 @ 04:40), 138 (04-28 @ 22:31), 140 (04-28 @ 17:03)  K: 4.0 (04-30 @ 04:40), 4.4 (04-28 @ 22:31), 4.7 (04-28 @ 17:03)  Cl: 105 (04-30 @ 04:40), 105 (04-28 @ 22:31), 104 (04-28 @ 17:03)  CO2: 23 (04-30 @ 04:40), 23 (04-28 @ 22:31), 25 (04-28 @ 17:03)  BUN: 11 (04-30 @ 04:40), 10 (04-28 @ 22:31), 12 (04-28 @ 17:03)  Cr: 0.83 (04-30 @ 04:40), 0.87 (04-28 @ 22:31), 0.91 (04-28 @ 17:03)  Glu: 90(04-30 @ 04:40), 91(04-28 @ 22:31), 99(04-28 @ 17:03)    Hgb: 12.8 (04-30 @ 04:40), 13.1 (04-28 @ 22:31), 13.3 (04-28 @ 17:03)  Hct: 40.1 (04-30 @ 04:40), 40.8 (04-28 @ 22:31), 42.0 (04-28 @ 17:03)  WBC: 10.78 (04-30 @ 04:40), 10.23 (04-28 @ 22:31), 11.51 (04-28 @ 17:03)  Plt: 301 (04-30 @ 04:40), 312 (04-28 @ 22:31), 336 (04-28 @ 17:03)    INR: 1.03 04-28-23 @ 17:03  PTT: 29.4 04-28-23 @ 17:03                ASSESSMENT/PLAN:    hx of CVA w/ Rt residual deficits, presenting with Acute on Chronic  Lt SDH w brqain compression and / 8mm L>R shift  POD 1 from Left callie hole for evacuation of chronic SDH. No issues. 1 10F Round SD SYUAPA placed    NEURO:  neuro checks q 2 hr   CT head pending   monitor subdural drain output 45 cc/24 hr   valproic acid 500 mg q 8 hr for seizure prophylaxis for 7 days day 2/7   video EEG for numbness and shaking of right UE    pain control  PT/OT/OBC     CVS:  - SBP goal 100-160 mmhg     PULM:  - RA    RENAL:  - IVL   - urinary retention, mccullough placed by urology     GI:  - regular diet   - Bowel regimen: miralax, senna  last BM 4/28     ENDO:   - FS goal 120-180    HEME/ONC:  - SCDs  - Chemoppx: hold  till tomorrow     ID:  afebrile       Patient is at high risk of neurologic deterioration/death due to:  brain compression, herniation, SDH expanison      Time spent: 35 critical care minutes

## 2023-05-01 PROBLEM — Z00.00 ENCOUNTER FOR PREVENTIVE HEALTH EXAMINATION: Status: ACTIVE | Noted: 2023-05-01

## 2023-05-01 PROCEDURE — 99233 SBSQ HOSP IP/OBS HIGH 50: CPT

## 2023-05-01 PROCEDURE — 70450 CT HEAD/BRAIN W/O DYE: CPT | Mod: 26

## 2023-05-01 RX ORDER — DIVALPROEX SODIUM 500 MG/1
500 TABLET, DELAYED RELEASE ORAL EVERY 8 HOURS
Refills: 0 | Status: DISCONTINUED | OUTPATIENT
Start: 2023-05-01 | End: 2023-05-02

## 2023-05-01 RX ORDER — ENOXAPARIN SODIUM 100 MG/ML
40 INJECTION SUBCUTANEOUS
Refills: 0 | Status: DISCONTINUED | OUTPATIENT
Start: 2023-05-01 | End: 2023-05-08

## 2023-05-01 RX ORDER — CHLORHEXIDINE GLUCONATE 213 G/1000ML
1 SOLUTION TOPICAL DAILY
Refills: 0 | Status: DISCONTINUED | OUTPATIENT
Start: 2023-05-01 | End: 2023-05-03

## 2023-05-01 RX ADMIN — POLYETHYLENE GLYCOL 3350 17 GRAM(S): 17 POWDER, FOR SOLUTION ORAL at 17:12

## 2023-05-01 RX ADMIN — DIVALPROEX SODIUM 500 MILLIGRAM(S): 500 TABLET, DELAYED RELEASE ORAL at 22:33

## 2023-05-01 RX ADMIN — SENNA PLUS 2 TABLET(S): 8.6 TABLET ORAL at 22:33

## 2023-05-01 RX ADMIN — ENOXAPARIN SODIUM 40 MILLIGRAM(S): 100 INJECTION SUBCUTANEOUS at 17:12

## 2023-05-01 RX ADMIN — CHLORHEXIDINE GLUCONATE 1 APPLICATION(S): 213 SOLUTION TOPICAL at 11:42

## 2023-05-01 RX ADMIN — POLYETHYLENE GLYCOL 3350 17 GRAM(S): 17 POWDER, FOR SOLUTION ORAL at 05:34

## 2023-05-01 RX ADMIN — Medication 1 TABLET(S): at 11:42

## 2023-05-01 RX ADMIN — Medication 55 MILLIGRAM(S): at 05:34

## 2023-05-01 RX ADMIN — DIVALPROEX SODIUM 500 MILLIGRAM(S): 500 TABLET, DELAYED RELEASE ORAL at 15:42

## 2023-05-01 NOTE — PROGRESS NOTE ADULT - ASSESSMENT
hx of CVA w/ Rt residual deficits, presenting with Acute on Chronic  Lt SDH w brqain compression and / 8mm L>R shift  POD 1 from Left callie hole for evacuation of chronic SDH. No issues. 1 10F Round SD SUYAPA placed    NEURO:  neuro checks q 2 hr   CT head pending   monitor subdural drain output 45 cc/24 hr   valproic acid 500 mg q 8 hr for seizure prophylaxis for 7 days day 2/7   video EEG for numbness and shaking of right UE    pain control  PT/OT/OBC     CVS:  - SBP goal 100-160 mmhg     PULM:  - RA    RENAL:  - IVL   - urinary retention, mccullough placed by urology     GI:  - regular diet   - Bowel regimen: miralax, senna  last BM 4/28     ENDO:   - FS goal 120-180    HEME/ONC:  - SCDs  - Chemoppx: hold  till tomorrow     ID:  afebrile          hx of CVA w/ Rt residual deficits, presenting with Acute on Chronic  Lt SDH w brqain compression and / 8mm L>R shift  POD 1 from Left callie hole for evacuation of chronic SDH. No issues. 1 10F Round SD SUYAPA placed    NEURO:  neuro checks q 2 hr   CT head pending   sd drain dc'ed today  valproic acid 500 mg q 8 hr for seizure prophylaxis for 7 days day 2/7   video EEG for numbness and shaking of right UE    pain control  PT/OT/OBC     CVS:  - SBP goal 100-160 mmhg     PULM:  - RA    RENAL:  - IVL   - urinary retention, mccullough placed by urology     GI:  - regular diet   - Bowel regimen: miralax, senna  last BM 4/28     ENDO:   - FS goal 120-180    HEME/ONC:  - SCDs  - Chemoppx: hold  until post pull scan and nsg clearance  - LED neg 4/30    ID:  afebrile          hx of CVA w/ Rt residual deficits, presenting with Acute on Chronic  Lt SDH w brqain compression and / 8mm L>R shift  POD 1 from Left callie hole for evacuation of chronic SDH. No issues. 1 10F Round SD SUYAPA placed    NEURO:  neuro checks q4 hr   CT head post pull  sd drain dc'ed today  valproic acid 500 mg q 8 hr for seizure prophylaxis for 7 days day 3/7   video EEG for numbness and shaking of right UE ; DC EEG today  pain control  PT/OT/OBC   Patient is medically clear for MMAE    CVS:  - SBP goal 100-160 mmhg     PULM:  - RA    RENAL:  - IVL   - urinary retention, mccullough placed by urology under cystoscopy    GI:  - regular diet   - Bowel regimen: miralax, senna  - last BM 4/28     ENDO:   - FS goal 120-180    HEME/ONC:  - SCDs  - Chemoppx: sql tonight  - LED neg 4/30    ID:  afebrile

## 2023-05-01 NOTE — PROGRESS NOTE ADULT - ASSESSMENT
hx of CVA w/ Rt residual deficits, presenting with Acute on Chronic  Lt SDH w brqain compression and / 8mm L>R shift  s/p Left callie hole for evacuation of chronic SDH    NEURO:  neuro checks q4 hr   sd drain dc'ed today, post pull scan with residual sdh  valproic acid 500 mg q 8 hr for seizure prophylaxis for 7 days day 3/7   EEg negative, dc'ed this afternoon  pain control  PT/OT/OBC   Patient is medically clear for MMAE    CVS:  - SBP goal 100-160 mmhg     PULM:  - RA    RENAL:  - IVL   - urinary retention, mccullough placed by urology under cystoscopy, to remain until discussed with urology    GI:  - regular diet   - Bowel regimen: miralax, senna  - last BM 4/28     ENDO:   - FS goal 120-180    HEME/ONC:  - SCDs  - Chemoppx: sql tonight  - LED neg 4/30    ID:  afebrile          hx of CVA w/ Rt residual deficits, presenting with Acute on Chronic  Lt SDH w brqain compression and / 8mm L>R shift  s/p Left callie hole for evacuation of chronic SDH    NEURO:  neuro checks q4 hr   sd drain dc'ed today, post pull scan with residual sdh  valproic acid 500 mg q 8 hr for seizure prophylaxis for 7 days day 3/7   EEg negative, dc'ed this afternoon  pain control  PT/OT/OBC   Patient is medically clear for MMAE    CVS:  - SBP goal 100-160 mmhg     PULM:  - RA    RENAL:  - IVL   - urinary retention, mccullough placed by urology under cystoscopy, to remain until discussed with urology    GI:  - regular diet   - Bowel regimen: miralax, senna  - last BM 4/28     ENDO:   - FS goal 120-180    HEME/ONC:  - SCDs  - Chemoppx: sql tonight  - LED neg 4/30    ID:  afebrile     full code  stable for transfer to floor

## 2023-05-01 NOTE — PROGRESS NOTE ADULT - SUBJECTIVE AND OBJECTIVE BOX
[facility] Progress Note    Assessment/Hospital Course:  88 yo man with HTN, HLD and prior stroke with residual R sided weakness, admitted 4/28 after being found down on bathroom floor, CTH with L subacute on chronic SDH with 8mm MLS, s/p L callie hole for evacuation on 4/29. Post-op CTH with improved size of SDH.       24 Hour Events/Subjective:  - POD2 s/p Lt callie hole for sdh evac  - drain removed today, post pull scan with rsidual SDH with 5mm L>R MLS; NSg aware      REVIEW OF SYSTEMS:  - negative except as above    VITALS:   - Reviewed      IMAGING/DATA:   - Reviewed          PHYSICAL EXAM:    General: calm  CVS: RRR  Pulm: CTAB  GI: Soft, NTND  Extremities: No LE Edema  Neuro:  awake, alert, oriented to self and year, not oriented to place, follows commands, PERRL, EOMI, face symmetric, with a R pronator drift, no LE drift. With a mild resting and action tremor in R arm.    [facility] Progress Note    Assessment/Hospital Course:  88 yo man with HTN, HLD and prior stroke with residual R sided weakness, admitted 4/28 after being found down on bathroom floor, CTH with L subacute on chronic SDH with 8mm MLS, s/p L callie hole for evacuation on 4/29. Post-op CTH with improved size of SDH.     24 Hour Events/Subjective:  - POD2 s/p Lt callie hole for sdh evac  - drain removed today, post pull scan with rsidual SDH with 5mm L>R MLS; NSg aware    REVIEW OF SYSTEMS:  - negative except as above    VITALS:   - Reviewed      IMAGING/DATA:   - Reviewed          PHYSICAL EXAM:    General: calm  CVS: RRR  Pulm: CTAB  GI: Soft, NTND  Extremities: No LE Edema  Neuro:  awake, alert, oriented to self and year, not oriented to place, follows commands, PERRL, EOMI, face symmetric, with a R pronator drift, no LE drift. With a mild resting and action tremor in R arm.

## 2023-05-01 NOTE — EEG REPORT - NS EEG TEXT BOX
[facility]  COMPREHENSIVE EPILEPSY CENTER  REPORT OF CONTINUOUS VIDEO EEG    [facility]: [address] , [place] [address], Phone [phone]  [place] Office: [address], [address], [place] [address] Phone [phone]    [facility]: [address], [place] [address], Phone [phone]  [place] Office: [address], [address], [place] [address], Phone [phone]    Patient Name: Jeremy Potter    Age: 87 year, : 1935  Patient ID: -, MRN #: -, [place] [facility] [place]  Referring Physician: -  EEG #: [id number]    Study Time/Date: 12:30:17 PM on 2023  	  End Time/Date: 0800 on 2023          			   Duration: 19H 30min    Study Information:    EEG Recording Technique:  The patient underwent continuous Video-EEG monitoring, using Telemetry System hardware on the XLTek Digital System. EEG and video data were stored on a computer hard drive with important events saved in digital archive files. The material was reviewed by a physician (electroencephalographer / epileptologist) on a daily basis. Chad and seizure detection algorithms were utilized and reviewed. An EEG Technician attended to the patient, and was available throughout daytime work hours.  The epilepsy center neurologist was available in person or on call 24-hours per day.    EEG Placement and Labeling of Electrodes:  The EEG was performed utilizing 20 channel referential EEG connections (coronal over temporal over parasagittal montage) using all standard 10-20 electrode placements with EKG, with additional electrodes placed in the inferior temporal region using the modified 10-10 montage electrode placements for elective admissions, or if deemed necessary. Recording was at a sampling rate of 256 samples per second per channel. Time synchronized digital video recording was done simultaneously with EEG recording. A low light infrared camera was used for low light recording.     History:  Concern for seizures.    Pertinent Medication  DEPACON IVPB    ZOFRAN    TYLENOL      Interpretation:    Daily EEG Visual Analysis  Findings: The background was continuous and reactive. During wakefulness, the posterior dominant rhythm consisted of well-modulated 8 Hz activity, with amplitude to 30 uV, that attenuated to eye opening.     Background Slowing:  Excess diffuse polymorphic delta slowing.    Focal Slowing:   Intermittent irregular theta and delta activity over the left hemisphere.    Sleep Background:  Drowsiness was characterized by fragmentation, attenuation, and slowing of the background activity.    Sleep was characterized by the presence of vertex waves, symmetric sleep spindles and K-complexes.    Other Non-Epileptiform Findings:  None were present.    Interictal Epileptiform Activity:   None were present.    Events:  Clinical events: None recorded.  Seizures: None recorded.    Activation Procedures:   Hyperventilation was not performed.    Photic stimulation was not performed.     Artifacts:  Intermittent myogenic and movement artifacts were noted.    EEG Summary / Classification:  Abnormal EEG in the awake, drowsy and asleep states.  1.	Left hemispheric slowing  2.	Mild diffuse slowing    EEG Impression / Clinical Correlate:  Abnormal EEG study.  There is focal dysfunction affecting the left hemisphere.  Non-specific mild diffuse or multifocal cerebral dysfunction    **In absence of additional clinical concerns, recommend consideration for discontinuation of current EEG study with reconnection in future if warranted.    Angelo Colunga MD  Attending Physician, [facility] [place]       [facility]  COMPREHENSIVE EPILEPSY CENTER  REPORT OF CONTINUOUS VIDEO EEG    [facility]: [address] , [place] [address], Phone [phone]  [place] Office: [address], [address], [place] [address] Phone [phone]    [facility]: [address], [place] [address], Phone [phone]  [place] Office: [address], [place] [address], Phone [phone]    Patient Name: Jeremy Potter    Age: 87 year, : 1935  Patient ID: -, MRN #: -, [place] [facility] [place]  Referring Physician: -  EEG #: [id number]    Study Time/Date: 12:30:17 PM on 2023  	  End Time/Date: 1000 on 2023          			   Duration: 21H 30min    Study Information:    EEG Recording Technique:  The patient underwent continuous Video-EEG monitoring, using Telemetry System hardware on the XLTek Digital System. EEG and video data were stored on a computer hard drive with important events saved in digital archive files. The material was reviewed by a physician (electroencephalographer / epileptologist) on a daily basis. Chad and seizure detection algorithms were utilized and reviewed. An EEG Technician attended to the patient, and was available throughout daytime work hours.  The epilepsy center neurologist was available in person or on call 24-hours per day.    EEG Placement and Labeling of Electrodes:  The EEG was performed utilizing 20 channel referential EEG connections (coronal over temporal over parasagittal montage) using all standard 10-20 electrode placements with EKG, with additional electrodes placed in the inferior temporal region using the modified 10-10 montage electrode placements for elective admissions, or if deemed necessary. Recording was at a sampling rate of 256 samples per second per channel. Time synchronized digital video recording was done simultaneously with EEG recording. A low light infrared camera was used for low light recording.     History:  Concern for seizures.    Pertinent Medication  DEPACON IVPB    ZOFRAN    TYLENOL      Interpretation:    Daily EEG Visual Analysis  Findings: The background was continuous and reactive. During wakefulness, the posterior dominant rhythm consisted of well-modulated 8 Hz activity, with amplitude to 30 uV, that attenuated to eye opening.     Background Slowing:  Excess diffuse polymorphic delta slowing.    Focal Slowing:   Intermittent irregular theta and delta activity over the left hemisphere.    Sleep Background:  Drowsiness was characterized by fragmentation, attenuation, and slowing of the background activity.    Sleep was characterized by the presence of vertex waves, symmetric sleep spindles and K-complexes.    Other Non-Epileptiform Findings:  None were present.    Interictal Epileptiform Activity:   None were present.    Events:  Clinical events: None recorded.  Seizures: None recorded.    Activation Procedures:   Hyperventilation was not performed.    Photic stimulation was not performed.     Artifacts:  Intermittent myogenic and movement artifacts were noted.    EEG Summary / Classification:  Abnormal EEG in the awake, drowsy and asleep states.  1.	Left hemispheric slowing  2.	Mild diffuse slowing    EEG Impression / Clinical Correlate:  Abnormal EEG study.  There is focal dysfunction affecting the left hemisphere.  Non-specific mild diffuse or multifocal cerebral dysfunction    **In absence of additional clinical concerns, recommend consideration for discontinuation of current EEG study with reconnection in future if warranted.    Angelo Colunga MD  Attending Physician, [facility] [place]

## 2023-05-01 NOTE — CHART NOTE - NSCHARTNOTEFT_GEN_A_CORE
AMELIA [id number]      Drain type: []SD []SG [x] SUYAPA [] HMV [] Lumbar drain [] EVD [] ICP [place] [] Abd drain     Patient's position while drain removed: flat    [x] Patient tolerated well [x] No complications [] complications:     Exit Site secured with: x] _1_ staples [] __ suture (please specify how many of each)     Additional Info:
CAPRINI SCORE [CLOT] Score on Admission for     AGE RELATED RISK FACTORS                                                       MOBILITY RELATED FACTORS  [ ] Age 41-60 years                                            (1 Point)                  [ ] Bed rest                                                        (1 Point)  [ ] Age: 61-74 years                                           (2 Points)                 [ ] Plaster cast                                                   (2 Points)  [x ] Age= 75 years                                              (3 Points)                 [ ] Bed bound for more than 72 hours                 (2 Points)    DISEASE RELATED RISK FACTORS                                               GENDER SPECIFIC FACTORS  [ ] Edema in the lower extremities                       (1 Point)                  [ ] Pregnancy                                                     (1 Point)  [ ] Varicose veins                                               (1 Point)                  [ ] Post-partum < 6 weeks                                   (1 Point)             [ ] BMI > 25 Kg/m2                                            (1 Point)                  [ ] Hormonal therapy  or oral contraception          (1 Point)                 [ ] Sepsis (in the previous month)                        (1 Point)                  [ ] History of pregnancy complications                 (1 point)  [ ] Pneumonia or serious lung disease                                               [ ] Unexplained or recurrent                     (1 Point)           (in the previous month)                               (1 Point)  [ ] Abnormal pulmonary function test                     (1 Point)                 SURGERY RELATED RISK FACTORS (include planned surgeries)  [ ] Acute myocardial infarction                              (1 Point)                 [ ]  Section                                             (1 Point)  [ ] Congestive heart failure (in the previous month)  (1 Point)         [ ] Minor surgery                                                  (1 Point)   [ ] Inflammatory bowel disease                             (1 Point)                 [ ] Arthroscopic surgery                                        (2 Points)  [ ] Central venous access                                      (2 Points)                [x ] General surgery lasting more than 45 minutes   (2 Points)       [ ] Stroke (in the previous month)                          (5 Points)               [ ] Elective arthroplasty                                         (5 Points)            [ ] current or past malignancy                              (2 Points)                                                                                                       HEMATOLOGY RELATED FACTORS                                                 TRAUMA RELATED RISK FACTORS  [ ] Prior episodes of VTE                                     (3 Points)                [ ] Fracture of the hip, pelvis, or leg                       (5 Points)  [ ] Positive family history for VTE                         (3 Points)                 [ ] Acute spinal cord injury (in the previous month)  (5 Points)  [ ] Prothrombin 27616 A                                     (3 Points)                 [ ] Paralysis  (less than 1 month)                             (5 Points)  [ ] Factor V Leiden                                             (3 Points)                  [ ] Multiple Trauma within 1 month                        (5 Points)  [ ] Lupus anticoagulants                                     (3 Points)                                                           [ ] Anticardiolipin antibodies                               (3 Points)                                                       [ ] High homocysteine in the blood                      (3 Points)                                             [ ] Other congenital or acquired thrombophilia      (3 Points)                                                [ ] Heparin induced thrombocytopenia                  (3 Points)                                          Total Score [    5      ]    Risk:  Very low 0   Low 1 to 2   Moderate 3 to 4   High =5       VTE Prophylasix Recommednations:  [x ] mechanical pneumatic compression devices                                      [ ] contraindicated: _____________________  [ ] chemo prophylasix                                                                                   [ ] contraindicated _____________________    **** HIGH LIKELIHOOD DVT PRESENT ON ADMISSION  [ x] (please order LE dopplers within 24 hours of admission)
Patient needed access.  Under ultrasound arrow 20 G PIV was placed in LUE and confirmed with VBG before use.  Dressing applied.  No complications.

## 2023-05-01 NOTE — PROGRESS NOTE ADULT - SUBJECTIVE AND OBJECTIVE BOX
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 Post-op CTH with improved size of SDH.       24 Hour Events/Subjective:  - POD2 s/p Lt callie hole for sdh evac      REVIEW OF SYSTEMS:  - negative except as above    VITALS:   - Reviewed      IMAGING/DATA:   - Reviewed          PHYSICAL EXAM:    General: calm  CVS: RRR  Pulm: CTAB  GI: Soft, NTND  Extremities: No LE Edema  Neuro:  awake, alert, oriented to self and year, not oriented to place, follows commands, PERRL, EOMI, face symmetric, with a R pronator drift, no LE drift. With a mild resting and action tremor in R arm.    NSCU Progress Note    Assessment/Hospital Course:  88 yo man with HTN, HLD and prior stroke with residual R sided weakness, admitted 4/28 after being found down on bathroom floor, CTH with L subacute on chronic SDH with 8mm MLS, s/p L callie hole for evacuation on 4/29. Post-op CTH with improved size of SDH.       24 Hour Events/Subjective:  - POD2 s/p Lt callie hole for sdh evac  - drain removed today      REVIEW OF SYSTEMS:  - negative except as above    VITALS:   - Reviewed      IMAGING/DATA:   - Reviewed          PHYSICAL EXAM:    General: calm  CVS: RRR  Pulm: CTAB  GI: Soft, NTND  Extremities: No LE Edema  Neuro:  awake, alert, oriented to self and year, not oriented to place, follows commands, PERRL, EOMI, face symmetric, with a R pronator drift, no LE drift. With a mild resting and action tremor in R arm.    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 Post-op CTH with improved size of SDH.       24 Hour Events/Subjective:  - POD2 s/p Lt callie hole for sdh evac  - drain removed today, post pull scan with rsidual SDH with 5mm L>R MLS; NSg aware      REVIEW OF SYSTEMS:  - negative except as above    VITALS:   - Reviewed      IMAGING/DATA:   - Reviewed          PHYSICAL EXAM:    General: calm  CVS: RRR  Pulm: CTAB  GI: Soft, NTND  Extremities: No LE Edema  Neuro:  awake, alert, oriented to self and year, not oriented to place, follows commands, PERRL, EOMI, face symmetric, with a R pronator drift, no LE drift. With a mild resting and action tremor in R arm.

## 2023-05-02 LAB
ANION GAP SERPL CALC-SCNC: 12 MMOL/L — SIGNIFICANT CHANGE UP (ref 5–17)
ANION GAP SERPL CALC-SCNC: 12 MMOL/L — SIGNIFICANT CHANGE UP (ref 5–17)
BUN SERPL-MCNC: 20 MG/DL — SIGNIFICANT CHANGE UP (ref 7–23)
BUN SERPL-MCNC: 21 MG/DL — SIGNIFICANT CHANGE UP (ref 7–23)
CALCIUM SERPL-MCNC: 9.1 MG/DL — SIGNIFICANT CHANGE UP (ref 8.4–10.5)
CALCIUM SERPL-MCNC: 9.1 MG/DL — SIGNIFICANT CHANGE UP (ref 8.4–10.5)
CHLORIDE SERPL-SCNC: 102 MMOL/L — SIGNIFICANT CHANGE UP (ref 96–108)
CHLORIDE SERPL-SCNC: 102 MMOL/L — SIGNIFICANT CHANGE UP (ref 96–108)
CO2 SERPL-SCNC: 22 MMOL/L — SIGNIFICANT CHANGE UP (ref 22–31)
CO2 SERPL-SCNC: 22 MMOL/L — SIGNIFICANT CHANGE UP (ref 22–31)
CREAT SERPL-MCNC: 0.83 MG/DL — SIGNIFICANT CHANGE UP (ref 0.5–1.3)
CREAT SERPL-MCNC: 0.91 MG/DL — SIGNIFICANT CHANGE UP (ref 0.5–1.3)
EGFR: 83 ML/MIN/1.73M2 — SIGNIFICANT CHANGE UP
EGFR: 86 ML/MIN/1.73M2 — SIGNIFICANT CHANGE UP
GLUCOSE SERPL-MCNC: 105 MG/DL — HIGH (ref 70–99)
GLUCOSE SERPL-MCNC: 109 MG/DL — HIGH (ref 70–99)
HCT VFR BLD CALC: 38.1 % — LOW (ref 39–50)
HGB BLD-MCNC: 12.4 G/DL — LOW (ref 13–17)
MAGNESIUM SERPL-MCNC: 2.1 MG/DL — SIGNIFICANT CHANGE UP (ref 1.6–2.6)
MCHC RBC-ENTMCNC: 28.8 PG — SIGNIFICANT CHANGE UP (ref 27–34)
MCHC RBC-ENTMCNC: 32.5 GM/DL — SIGNIFICANT CHANGE UP (ref 32–36)
MCV RBC AUTO: 88.4 FL — SIGNIFICANT CHANGE UP (ref 80–100)
NRBC # BLD: 0 /100 WBCS — SIGNIFICANT CHANGE UP (ref 0–0)
PHOSPHATE SERPL-MCNC: 3.5 MG/DL — SIGNIFICANT CHANGE UP (ref 2.5–4.5)
PLATELET # BLD AUTO: 322 K/UL — SIGNIFICANT CHANGE UP (ref 150–400)
POTASSIUM SERPL-MCNC: 4.4 MMOL/L — SIGNIFICANT CHANGE UP (ref 3.5–5.3)
POTASSIUM SERPL-MCNC: 5.5 MMOL/L — HIGH (ref 3.5–5.3)
POTASSIUM SERPL-SCNC: 4.4 MMOL/L — SIGNIFICANT CHANGE UP (ref 3.5–5.3)
POTASSIUM SERPL-SCNC: 5.5 MMOL/L — HIGH (ref 3.5–5.3)
RBC # BLD: 4.31 M/UL — SIGNIFICANT CHANGE UP (ref 4.2–5.8)
RBC # FLD: 13.7 % — SIGNIFICANT CHANGE UP (ref 10.3–14.5)
SODIUM SERPL-SCNC: 136 MMOL/L — SIGNIFICANT CHANGE UP (ref 135–145)
SODIUM SERPL-SCNC: 136 MMOL/L — SIGNIFICANT CHANGE UP (ref 135–145)
WBC # BLD: 10.01 K/UL — SIGNIFICANT CHANGE UP (ref 3.8–10.5)
WBC # FLD AUTO: 10.01 K/UL — SIGNIFICANT CHANGE UP (ref 3.8–10.5)

## 2023-05-02 PROCEDURE — 99233 SBSQ HOSP IP/OBS HIGH 50: CPT

## 2023-05-02 RX ORDER — SODIUM CHLORIDE 9 MG/ML
500 INJECTION INTRAMUSCULAR; INTRAVENOUS; SUBCUTANEOUS ONCE
Refills: 0 | Status: COMPLETED | OUTPATIENT
Start: 2023-05-02 | End: 2023-05-02

## 2023-05-02 RX ORDER — TAMSULOSIN HYDROCHLORIDE 0.4 MG/1
0.4 CAPSULE ORAL AT BEDTIME
Refills: 0 | Status: DISCONTINUED | OUTPATIENT
Start: 2023-05-02 | End: 2023-05-08

## 2023-05-02 RX ORDER — VALPROIC ACID (AS SODIUM SALT) 250 MG/5ML
500 SOLUTION, ORAL ORAL EVERY 8 HOURS
Refills: 0 | Status: DISCONTINUED | OUTPATIENT
Start: 2023-05-02 | End: 2023-05-04

## 2023-05-02 RX ADMIN — Medication 500 MILLIGRAM(S): at 21:06

## 2023-05-02 RX ADMIN — DIVALPROEX SODIUM 500 MILLIGRAM(S): 500 TABLET, DELAYED RELEASE ORAL at 05:30

## 2023-05-02 RX ADMIN — CHLORHEXIDINE GLUCONATE 1 APPLICATION(S): 213 SOLUTION TOPICAL at 12:32

## 2023-05-02 RX ADMIN — Medication 1 TABLET(S): at 12:33

## 2023-05-02 RX ADMIN — POLYETHYLENE GLYCOL 3350 17 GRAM(S): 17 POWDER, FOR SOLUTION ORAL at 17:31

## 2023-05-02 RX ADMIN — POLYETHYLENE GLYCOL 3350 17 GRAM(S): 17 POWDER, FOR SOLUTION ORAL at 05:30

## 2023-05-02 RX ADMIN — Medication 650 MILLIGRAM(S): at 21:06

## 2023-05-02 RX ADMIN — ENOXAPARIN SODIUM 40 MILLIGRAM(S): 100 INJECTION SUBCUTANEOUS at 17:31

## 2023-05-02 RX ADMIN — TAMSULOSIN HYDROCHLORIDE 0.4 MILLIGRAM(S): 0.4 CAPSULE ORAL at 21:06

## 2023-05-02 RX ADMIN — SODIUM CHLORIDE 500 MILLILITER(S): 9 INJECTION INTRAMUSCULAR; INTRAVENOUS; SUBCUTANEOUS at 23:26

## 2023-05-02 RX ADMIN — Medication 650 MILLIGRAM(S): at 21:36

## 2023-05-02 RX ADMIN — Medication 500 MILLIGRAM(S): at 15:37

## 2023-05-02 NOTE — DIETITIAN INITIAL EVALUATION ADULT - PERTINENT MEDS FT
MEDICATIONS  (STANDING):  chlorhexidine 4% Liquid 1 Application(s) Topical daily  enoxaparin Injectable 40 milliGRAM(s) SubCutaneous <User Schedule>  multivitamin 1 Tablet(s) Oral daily  polyethylene glycol 3350 17 Gram(s) Oral every 12 hours  senna 2 Tablet(s) Oral at bedtime  tamsulosin 0.4 milliGRAM(s) Oral at bedtime  valproic  acid Syrup 500 milliGRAM(s) Oral every 8 hours    MEDICATIONS  (PRN):  acetaminophen     Tablet .. 650 milliGRAM(s) Oral every 6 hours PRN Temp greater or equal to 38C (100.4F), Mild Pain (1 - 3)  ondansetron Injectable 4 milliGRAM(s) IV Push every 6 hours PRN Nausea and/or Vomiting  oxyCODONE    IR 5 milliGRAM(s) Oral every 4 hours PRN Moderate Pain (4 - 6)

## 2023-05-02 NOTE — PROGRESS NOTE ADULT - ASSESSMENT
hx of CVA w/ Rt residual deficits, presenting with Acute on Chronic  Lt SDH w brqain compression and / 8mm L>R shift  s/p Left callie hole for evacuation of chronic SDH    NEURO:  neuro checks q4 hr   sd drain dc'ed today, post pull scan with residual sdh  valproic acid 500 mg q 8 hr for seizure prophylaxis for 7 days day 3/7   EEg negative, dc'ed this afternoon  pain control  PT/OT/OBC   Patient is medically clear for MMAE    CVS:  - SBP goal 100-160 mmhg     PULM:  - RA    RENAL:  - IVL   - urinary retention, mccullough placed by urology under cystoscopy, to remain until discussed with urology    GI:  - regular diet   - Bowel regimen: miralax, senna  - last BM 4/28     ENDO:   - FS goal 120-180    HEME/ONC:  - SCDs  - Chemoppx: sql tonight  - LED neg 4/30    ID:  afebrile     full code  stable for transfer to floor      hx of CVA w/ Rt residual deficits, presenting with Acute on Chronic  Lt SDH w brqain compression and / 8mm L>R shift  s/p Left callie hole for evacuation of chronic SDH    NEURO:  neuro checks q4 hr   cth with residual sdh and 5mm mls  valproic acid 500 mg q 8 hr for seizure prophylaxis for 7 days day 4/7   pain control  PT/OT/OBC   patient refused mmae    CVS:  - SBP goal 100-160 mmhg     PULM:  - RA    RENAL:  - IVL   - urinary retention, mccullough placed by urology under cystoscopy, to remain until discussed with urology    GI:  - regular diet   - Bowel regimen: miralax, senna  - last BM 5/1    ENDO:   - FS goal 120-180    HEME/ONC:  - SCDs  - Chemoppx: sql   - LED neg 4/30    ID:  afebrile     full code  stable for transfer to floor

## 2023-05-02 NOTE — DIETITIAN INITIAL EVALUATION ADULT - ORAL INTAKE PTA/DIET HISTORY
Pt was eating well with no changes in appetite. Confirms no known food allergies. Pt was not following therapeutic diet. Denies Hx of chewing or swallowing issues. Took Vitamin B12. Reports drinking protein shake but cannot recall name.

## 2023-05-02 NOTE — PHARMACOTHERAPY INTERVENTION NOTE - COMMENTS
Confirmed home medication with patient's daughter Kirstin. Patient's plavix was stopped recently only after 7 days of treatment advise by outside prescriber. He has been have incontinence prior to admission. Per Kirstin the flomax was going to be started and patient to be assess for tolerance.  Kirstin did not see oxybutynin on the list of home medications  Home Medications:  aspirin 81 mg oral delayed release tablet: 1 tab(s) orally once a day (02 May 2023 16:19)  atorvastatin 40 mg oral tablet: 1 tab(s) orally once a day (02 May 2023 16:22)  Flomax 0.4 mg oral capsule: 1 cap(s) orally once a day X Not yet restarted (02 May 2023 16:19)  Norvasc 10 mg oral tablet: 1 tab(s) orally once a day (02 May 2023 16:22)

## 2023-05-02 NOTE — PROGRESS NOTE ADULT - SUBJECTIVE AND OBJECTIVE BOX
NSCU Progress Note    Assessment/Hospital Course:  88 yo man with HTN, HLD and prior stroke with residual R sided weakness, admitted 4/28 after being found down on bathroom floor, CTH with L subacute on chronic SDH with 8mm MLS, s/p L callie hole for evacuation on 4/29. Post-op CTH with improved size of SDH.     24 Hour Events/Subjective:  - POD2 s/p Lt callie hole for sdh evac  - drain removed today, post pull scan with rsidual SDH with 5mm L>R MLS; NSg aware    REVIEW OF SYSTEMS:  - negative except as above    VITALS:   - Reviewed      IMAGING/DATA:   - Reviewed          PHYSICAL EXAM:    General: calm  CVS: RRR  Pulm: CTAB  GI: Soft, NTND  Extremities: No LE Edema  Neuro:  awake, alert, oriented to self and year, not oriented to place, follows commands, PERRL, EOMI, face symmetric, with a R pronator drift, no LE drift. With a mild resting and action tremor in R arm.    NSCU Progress Note    Assessment/Hospital Course:  88 yo man with HTN, HLD and prior stroke with residual R sided weakness, admitted 4/28 after being found down on bathroom floor, CTH with L subacute on chronic SDH with 8mm MLS, s/p L callie hole for evacuation on 4/29. Post-op CTH with improved size of SDH.     24 Hour Events/Subjective:  - POD3 s/p Lt callie hole for sdh evac  - pending floor txer    REVIEW OF SYSTEMS:  - negative except as above    VITALS:   - Reviewed      IMAGING/DATA:   - Reviewed          PHYSICAL EXAM:    General: calm  CVS: RRR  Pulm: CTAB  GI: Soft, NTND  Extremities: No LE Edema  Neuro:  awake, alert, oriented to self and year, not oriented to place, follows commands, PERRL, EOMI, face symmetric, with a R pronator drift, no LE drift. With a mild resting and action tremor in R arm.

## 2023-05-02 NOTE — DIETITIAN INITIAL EVALUATION ADULT - OTHER INFO
Wt Hx:   Dosing wt 63.5 kG/139.9 lbs.   UBW ~145 lbs and denies any changes in wt PTA.  Ht per pt: 65 inches    IBW:  136 lbs   IBW%: 103%    Nutrition-Related Concerns:   - "L subacute on chronic SDH with 8mm MLS, s/p L callie hole for evacuation on 4/29"  - Ordered for multivitamin

## 2023-05-02 NOTE — DIETITIAN INITIAL EVALUATION ADULT - ADD RECOMMEND
Reinforce nutrition education as able. As medically feasible, continue to provide micronutrients. Continue to trend labs, weight, skin integrity, and intake.

## 2023-05-02 NOTE — DIETITIAN INITIAL EVALUATION ADULT - OTHER CALCULATIONS
Fluid needs deferred to provider. Nutrient needs made with consideration for increased needs and advanced age

## 2023-05-02 NOTE — DIETITIAN INITIAL EVALUATION ADULT - EDUCATION DIETARY MODIFICATIONS
RD provided education on increased protein needs, pt verbalized understanding. RD encouraged pt start meals with protein. Pt made aware RD to remain available./(2) meets goals/outcomes/verbalization

## 2023-05-02 NOTE — PROGRESS NOTE ADULT - SUBJECTIVE AND OBJECTIVE BOX
NSCU ATTENDING -- ADDITIONAL PROGRESS NOTE    Nighttime rounds were performed -- please refer to earlier Progress Note for HPI details.    T(C): 37.3 (05-02-23 @ 19:00), Max: 37.3 (05-02-23 @ 19:00)  HR: 79 (05-02-23 @ 20:00) (70 - 98)  BP: 116/67 (05-02-23 @ 20:00) (105/61 - 148/68)  RR: 18 (05-02-23 @ 20:00) (12 - 21)  SpO2: 96% (05-02-23 @ 20:00) (76% - 99%)  Wt(kg): --    Relevant labwork and imaging reviewed.    pending transfer to floor vs discharge  protected sleep time

## 2023-05-03 PROCEDURE — 99233 SBSQ HOSP IP/OBS HIGH 50: CPT

## 2023-05-03 RX ORDER — CHLORHEXIDINE GLUCONATE 213 G/1000ML
1 SOLUTION TOPICAL DAILY
Refills: 0 | Status: DISCONTINUED | OUTPATIENT
Start: 2023-05-03 | End: 2023-05-04

## 2023-05-03 RX ADMIN — TAMSULOSIN HYDROCHLORIDE 0.4 MILLIGRAM(S): 0.4 CAPSULE ORAL at 21:21

## 2023-05-03 RX ADMIN — Medication 1 TABLET(S): at 12:12

## 2023-05-03 RX ADMIN — Medication 500 MILLIGRAM(S): at 21:21

## 2023-05-03 RX ADMIN — CHLORHEXIDINE GLUCONATE 1 APPLICATION(S): 213 SOLUTION TOPICAL at 21:24

## 2023-05-03 RX ADMIN — Medication 500 MILLIGRAM(S): at 13:44

## 2023-05-03 RX ADMIN — Medication 500 MILLIGRAM(S): at 05:03

## 2023-05-03 RX ADMIN — ENOXAPARIN SODIUM 40 MILLIGRAM(S): 100 INJECTION SUBCUTANEOUS at 18:05

## 2023-05-03 NOTE — PROGRESS NOTE ADULT - ATTENDING COMMENTS
Agree with/edited as appropriate above
87-year-old man with HTN, HLD and prior CVA (residual right-sided weakness) presented with fall/syncope with work-up revealing left acute on chronic SDH.    Awake, alert, Ox3, follows, PERRL, EOMI, facial symmetrical, Rt neglect, RUE 4/5 RLE 4+/5, Lt side 5/5    Pre-op OR in AM  CT Head in AM  Pain control  Mobilize as tolerated  -150mmHg  Aspiration precautions    At risk of death/neuro decline due to: expansion of SDH and brain compression
Agree with above.

## 2023-05-03 NOTE — PROGRESS NOTE ADULT - SUBJECTIVE AND OBJECTIVE BOX
NSCU ATTENDING -- ADDITIONAL PROGRESS NOTE    Nighttime rounds were performed -- please refer to earlier Progress Note for HPI details.    ICU Vital Signs Last 24 Hrs  T(C): 37.1 (03 May 2023 19:00), Max: 37.4 (02 May 2023 23:00)  T(F): 98.8 (03 May 2023 19:00), Max: 99.3 (02 May 2023 23:00)  HR: 87 (03 May 2023 19:00) (69 - 87)  BP: 123/65 (03 May 2023 19:00) (86/62 - 128/58)  BP(mean): 81 (03 May 2023 19:00) (72 - 81)  ABP: --  ABP(mean): --  RR: 16 (03 May 2023 19:00) (13 - 20)  SpO2: 97% (03 May 2023 19:00) (94% - 100%)    O2 Parameters below as of 03 May 2023 19:00  Patient On (Oxygen Delivery Method): room air    O2 Concentration (%): 21    Relevant labwork and imaging reviewed.    pending transfer to floor vs discharge  protected sleep time

## 2023-05-03 NOTE — PROGRESS NOTE ADULT - ASSESSMENT
hx of CVA w/ Rt residual deficits, presenting with Acute on Chronic  Lt SDH w brqain compression and / 8mm L>R shift  s/p Left callie hole for evacuation of chronic SDH    NEURO:  neuro checks q4 hr   cth with residual sdh and 5mm mls  valproic acid 500 mg q 8 hr for seizure prophylaxis for 7 days day 4/7   pain control  PT/OT/OBC   patient refused mmae    CVS:  - SBP goal 100-160 mmhg     PULM:  - RA    RENAL:  - IVL   - urinary retention, mccullough placed by urology under cystoscopy, to remain until discussed with urology    GI:  - regular diet   - Bowel regimen: miralax, senna  - last BM 5/1    ENDO:   - FS goal 120-180    HEME/ONC:  - SCDs  - Chemoppx: sql   - LED neg 4/30    ID:  afebrile     full code  stable for transfer to floor      hx of CVA w/ Rt residual deficits, presenting with Acute on Chronic  Lt SDH w brqain compression and / 8mm L>R shift  s/p Left callie hole for evacuation of chronic SDH    NEURO:  neuro checks q4 hr   cth with residual sdh and 5mm mls  valproic acid 500 mg q 8 hr for seizure prophylaxis for 7 days  pain control  PT/OT/OBC   patient refused mmae    CVS:  - SBP goal 100-160 mmhg     PULM:  - RA    RENAL:  - IVL   - urinary retention, mccullough placed by urology under cystoscopy, to remain until discussed with urology    GI:  - regular diet   - Bowel regimen: miralax, senna  - last BM 5/1    ENDO:   - FS goal 120-180    HEME/ONC:  - SCDs  - Chemoppx: sql   - LED neg 4/30    ID:  afebrile     full code  stable for transfer to floor

## 2023-05-03 NOTE — PROGRESS NOTE ADULT - SUBJECTIVE AND OBJECTIVE BOX
NSCU Progress Note    Assessment/Hospital Course:  88 yo man with HTN, HLD and prior stroke with residual R sided weakness, admitted 4/28 after being found down on bathroom floor, CTH with L subacute on chronic SDH with 8mm MLS, s/p L callie hole for evacuation on 4/29. Post-op CTH with improved size of SDH.     24 Hour Events/Subjective:  - POD3 s/p Lt callie hole for sdh evac  - pending floor txer    REVIEW OF SYSTEMS:  - negative except as above    VITALS:   - Reviewed      IMAGING/DATA:   - Reviewed          PHYSICAL EXAM:    General: calm  CVS: RRR  Pulm: CTAB  GI: Soft, NTND  Extremities: No LE Edema  Neuro:  awake, alert, oriented to self and year, not oriented to place, follows commands, PERRL, EOMI, face symmetric, with a R pronator drift, no LE drift. With a mild resting and action tremor in R arm.    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 Post-op CTH with improved size of SDH.     24 Hour Events/Subjective:  - POD4 s/p Lt callie hole for sdh evac  - pending floor txer    REVIEW OF SYSTEMS:  - negative except as above    VITALS:   - Reviewed      IMAGING/DATA:   - Reviewed          PHYSICAL EXAM:    General: calm  CVS: RRR  Pulm: CTAB  GI: Soft, NTND  Extremities: No LE Edema  Neuro:  awake, alert, oriented to self and year, not oriented to place, follows commands, PERRL, EOMI, face symmetric, with a R pronator drift, no LE drift. With a mild resting and action tremor in R arm.

## 2023-05-04 ENCOUNTER — TRANSCRIPTION ENCOUNTER (OUTPATIENT)
Age: 88
End: 2023-05-04

## 2023-05-04 DIAGNOSIS — R33.9 RETENTION OF URINE, UNSPECIFIED: ICD-10-CM

## 2023-05-04 LAB
ANION GAP SERPL CALC-SCNC: 12 MMOL/L — SIGNIFICANT CHANGE UP (ref 5–17)
BASOPHILS # BLD AUTO: 0.05 K/UL — SIGNIFICANT CHANGE UP (ref 0–0.2)
BASOPHILS NFR BLD AUTO: 0.7 % — SIGNIFICANT CHANGE UP (ref 0–2)
BUN SERPL-MCNC: 13 MG/DL — SIGNIFICANT CHANGE UP (ref 7–23)
CALCIUM SERPL-MCNC: 9.6 MG/DL — SIGNIFICANT CHANGE UP (ref 8.4–10.5)
CHLORIDE SERPL-SCNC: 106 MMOL/L — SIGNIFICANT CHANGE UP (ref 96–108)
CO2 SERPL-SCNC: 22 MMOL/L — SIGNIFICANT CHANGE UP (ref 22–31)
CREAT SERPL-MCNC: 0.86 MG/DL — SIGNIFICANT CHANGE UP (ref 0.5–1.3)
EGFR: 85 ML/MIN/1.73M2 — SIGNIFICANT CHANGE UP
EOSINOPHIL # BLD AUTO: 0.31 K/UL — SIGNIFICANT CHANGE UP (ref 0–0.5)
EOSINOPHIL NFR BLD AUTO: 4.5 % — SIGNIFICANT CHANGE UP (ref 0–6)
GLUCOSE SERPL-MCNC: 88 MG/DL — SIGNIFICANT CHANGE UP (ref 70–99)
HCT VFR BLD CALC: 40.1 % — SIGNIFICANT CHANGE UP (ref 39–50)
HGB BLD-MCNC: 12.9 G/DL — LOW (ref 13–17)
IMM GRANULOCYTES NFR BLD AUTO: 0.4 % — SIGNIFICANT CHANGE UP (ref 0–0.9)
LYMPHOCYTES # BLD AUTO: 1.9 K/UL — SIGNIFICANT CHANGE UP (ref 1–3.3)
LYMPHOCYTES # BLD AUTO: 27.5 % — SIGNIFICANT CHANGE UP (ref 13–44)
MAGNESIUM SERPL-MCNC: 2.2 MG/DL — SIGNIFICANT CHANGE UP (ref 1.6–2.6)
MCHC RBC-ENTMCNC: 29.3 PG — SIGNIFICANT CHANGE UP (ref 27–34)
MCHC RBC-ENTMCNC: 32.2 GM/DL — SIGNIFICANT CHANGE UP (ref 32–36)
MCV RBC AUTO: 91.1 FL — SIGNIFICANT CHANGE UP (ref 80–100)
MONOCYTES # BLD AUTO: 0.75 K/UL — SIGNIFICANT CHANGE UP (ref 0–0.9)
MONOCYTES NFR BLD AUTO: 10.9 % — SIGNIFICANT CHANGE UP (ref 2–14)
NEUTROPHILS # BLD AUTO: 3.87 K/UL — SIGNIFICANT CHANGE UP (ref 1.8–7.4)
NEUTROPHILS NFR BLD AUTO: 56 % — SIGNIFICANT CHANGE UP (ref 43–77)
NRBC # BLD: 0 /100 WBCS — SIGNIFICANT CHANGE UP (ref 0–0)
PHOSPHATE SERPL-MCNC: 2.9 MG/DL — SIGNIFICANT CHANGE UP (ref 2.5–4.5)
PLATELET # BLD AUTO: 340 K/UL — SIGNIFICANT CHANGE UP (ref 150–400)
POTASSIUM SERPL-MCNC: 4 MMOL/L — SIGNIFICANT CHANGE UP (ref 3.5–5.3)
POTASSIUM SERPL-SCNC: 4 MMOL/L — SIGNIFICANT CHANGE UP (ref 3.5–5.3)
RBC # BLD: 4.4 M/UL — SIGNIFICANT CHANGE UP (ref 4.2–5.8)
RBC # FLD: 13.7 % — SIGNIFICANT CHANGE UP (ref 10.3–14.5)
SODIUM SERPL-SCNC: 140 MMOL/L — SIGNIFICANT CHANGE UP (ref 135–145)
WBC # BLD: 6.91 K/UL — SIGNIFICANT CHANGE UP (ref 3.8–10.5)
WBC # FLD AUTO: 6.91 K/UL — SIGNIFICANT CHANGE UP (ref 3.8–10.5)

## 2023-05-04 RX ORDER — VALPROIC ACID (AS SODIUM SALT) 250 MG/5ML
500 SOLUTION, ORAL ORAL EVERY 8 HOURS
Refills: 0 | Status: COMPLETED | OUTPATIENT
Start: 2023-05-04 | End: 2023-05-06

## 2023-05-04 RX ADMIN — ENOXAPARIN SODIUM 40 MILLIGRAM(S): 100 INJECTION SUBCUTANEOUS at 17:23

## 2023-05-04 RX ADMIN — SENNA PLUS 2 TABLET(S): 8.6 TABLET ORAL at 21:11

## 2023-05-04 RX ADMIN — POLYETHYLENE GLYCOL 3350 17 GRAM(S): 17 POWDER, FOR SOLUTION ORAL at 05:53

## 2023-05-04 RX ADMIN — Medication 500 MILLIGRAM(S): at 05:53

## 2023-05-04 RX ADMIN — Medication 1 TABLET(S): at 13:48

## 2023-05-04 RX ADMIN — Medication 500 MILLIGRAM(S): at 14:30

## 2023-05-04 RX ADMIN — TAMSULOSIN HYDROCHLORIDE 0.4 MILLIGRAM(S): 0.4 CAPSULE ORAL at 21:11

## 2023-05-04 RX ADMIN — Medication 500 MILLIGRAM(S): at 21:12

## 2023-05-04 NOTE — DISCHARGE NOTE PROVIDER - NSDCMRMEDTOKEN_GEN_ALL_CORE_FT
aspirin 81 mg oral delayed release tablet: 1 tab(s) orally once a day  atorvastatin 40 mg oral tablet: 1 tab(s) orally once a day  Flomax 0.4 mg oral capsule: 1 cap(s) orally once a day X Not yet restarted  Norvasc 10 mg oral tablet: 1 tab(s) orally once a day   acetaminophen 325 mg oral tablet: 2 tab(s) orally every 6 hours As needed Temp greater or equal to 38C (100.4F), Mild Pain (1 - 3)  atorvastatin 40 mg oral tablet: 1 tab(s) orally once a day  Flomax 0.4 mg oral capsule: 1 cap(s) orally once a day X Not yet restarted  Multiple Vitamins oral tablet: 1 tab(s) orally once a day  Norvasc 10 mg oral tablet: 1 tab(s) orally once a day  polyethylene glycol 3350 oral powder for reconstitution: 17 gram(s) orally every 12 hours  senna leaf extract oral tablet: 2 tab(s) orally once a day (at bedtime)

## 2023-05-04 NOTE — DISCHARGE NOTE PROVIDER - NSDCCPTREATMENT_GEN_ALL_CORE_FT
PRINCIPAL PROCEDURE  Procedure: Cristy hole with drainage of hematoma  Findings and Treatment: -Please follow up with Dr. Jauregui in 1-2 weeks of discharge.  -Please do not engage in strenuous activity, heavy lifting, drive, or return to work or school until cleared by surgeon.   -Please do not take NSAIDs- ie. advil, motrin, ibuprofen, aleve, aspirin, naproxen, etc. Tylenol/ acetaminophen are ok to take.   -Please keep incision clean and dry, do not submerge wound in water for prolonged periods of time, pat dry after showering, and do not use any creams or ointments to incision. No soaps or shampoo on incision site.  -Return to ER immediately for any of the following: fever, bleeding, new onset numbness/tingling/weakness, nausea and/or vomiting, chest pain, shortness of breath, confusion, seizure, altered mental status, urinary and/or fecal incontinence or retention.        PRINCIPAL PROCEDURE  Procedure: [place] hole with drainage of hematoma  Findings and Treatment: -Please follow up with Dr. Jauregui in 1-2 weeks of discharge.  -Please do not engage in strenuous activity, heavy lifting, drive, or return to work or school until cleared by surgeon.   -Please do not take NSAIDs- ie. advil, motrin, ibuprofen, aleve, aspirin, naproxen, etc. Tylenol/ acetaminophen are ok to take.   -Please keep incision clean and dry, do not submerge wound in water for prolonged periods of time, pat dry after showering, and do not use any creams or ointments to incision. No soaps or shampoo on incision site.  -Staples will be removed at follow up appointment. Keep incision site clean and dry. OK to shower but do not scrub incision area and pat dry when done.   -Return to ER immediately for any of the following: fever, bleeding, new onset numbness/tingling/weakness, nausea and/or vomiting, chest pain, shortness of breath, confusion, seizure, altered mental status, urinary and/or fecal incontinence or retention.        PRINCIPAL PROCEDURE  Procedure: Cristy hole with drainage of hematoma  Findings and Treatment: -Please follow up with Dr. Jauregui in 1-2 weeks of discharge.  -Please do not engage in strenuous activity, heavy lifting, drive, or return to work or school until cleared by surgeon.   -Please do not take NSAIDs- ie. advil, motrin, ibuprofen, aleve, aspirin, naproxen, etc. Tylenol/ acetaminophen are ok to take.   -Please keep incision clean and dry, do not submerge wound in water for prolonged periods of time, pat dry after showering, and do not use any creams or ointments to incision. No soaps or shampoo on incision site.  - Keep incision site clean and dry. OK to shower but do not scrub incision area and pat dry when done.   -Return to ER immediately for any of the following: fever, bleeding, new onset numbness/tingling/weakness, nausea and/or vomiting, chest pain, shortness of breath, confusion, seizure, altered mental status, urinary and/or fecal incontinence or retention.

## 2023-05-04 NOTE — DISCHARGE NOTE PROVIDER - HOSPITAL COURSE
87M Hx HTN/HLD non-complaint with his meds, CVA w/R sided residual weakness on DAPT but allegedly not taken last week, presents after found down bathroom floor, brought to [facility] on 4/28 where CT revealed left SDH. Patient was transferred to [facility] for left callie hole on 4/29. Subdural drain discontinued 5/3/23. Hospital course complicated by urinary retention requiring catheterization from Urology. Patient downgraded from NSCU to floor 5/4/23. Physical and Occupational therapy recommend sub-acute rehab upon discharge.     On the day of discharge she/he is medically and neurologically stable for discharge to subacute rehab.   87M Hx HTN/HLD non-complaint with his meds, CVA w/ Right sided residual weakness on DAPT but allegedly not taken last week, presents after found down bathroom floor, brought to [facility] on 4/28 where CT revealed left SDH. Patient was transferred to [facility] and underwent a left callie hole for SDH evacuation on 4/29 [facility] Subdural drain discontinued 5/3/23. Hospital course complicated by urinary retention requiring catheterization from Urology. Patient downgraded from NSCU to floor 5/4/23. Physical and Occupational therapy recommend sub-acute rehab upon discharge.     On the day of discharge he is medically and neurologically stable for discharge to subacute rehab.   87M Hx HTN/HLD non-complaint with his meds, CVA w/ Right sided residual weakness on DAPT but allegedly not taken last week, presents after found down bathroom floor, brought to [facility] on 4/28 where CT revealed left SDH. Patient was transferred to [facility] and underwent a left callie hole for SDH evacuation on 4/29 and [facility] Subdural drain discontinued 5/3/23. Hospital course complicated by urinary retention requiring catheterization from Urology. vEEG negative for seizures. Patient downgraded from NSCU to floor 5/4/23. Physical and Occupational therapy recommend sub-acute rehab upon discharge.     On the day of discharge he is medically and neurologically stable for discharge to subacute rehab.

## 2023-05-04 NOTE — PROVIDER CONTACT NOTE (OTHER) - REASON
Pt bleeding from penis
Bladder scan shows PT has 423 of urine in bladder
PT passed dysphagia
Unable to straight cath PT

## 2023-05-04 NOTE — PROGRESS NOTE ADULT - ASSESSMENT
ASSESSMENT:  87M Hx HTN/HLD non-complaint with his meds, CVA w/R sided residual weakness on DAPT but allegedly not taken last week, HTN presents after found down bathroom floor found to have L SDH on 4/28 at [facility], transferred to [facility] for left callie hole on 4/29.    NEURO:  Pain control with Tylenol and oxy 5  Activity: [x] OOB as tolerated with assistance    PULM:  Incentive spirometry, mobilize as tolerated    CV:  Keep -150mmHg    RENAL:  IVL  mccullough d/ashley 5/3  urology consulted for clots passed overnight    GI:  Diet: regular  GI prophylaxis [x] not indicated [] PPI [] other:  Bowel regimen [] colace [x] senna [] other: miralax    ENDO:   Goal euglycemia (-180)    HEME/ONC:  VTE prophylaxis: [] SCDs [x] chemoprophylaxis [] hold chemoprophylaxis due to: [] high risk of DVT/PE on admission due to:    ID:  Anny-op antibiotics completed    MISC:      CODE STATUS:  [x] Full Code [] DNR [] DNI [] Palliative/Comfort Care    DISPOSITION: subacute rehab  [] ICU [] Stroke Unit [x] Floor [] EMU [] RCU [] PCU    will discuss with Dr. Jauregui    [id number] ASSESSMENT:  87M Hx HTN/HLD non-complaint with his meds, CVA w/R sided residual weakness on DAPT but allegedly not taken last week, HTN presents after found down bathroom floor found to have L SDH on 4/28 at [facility], transferred to [facility] for left callie hole on 4/29.    NEURO:  Pain control with Tylenol and oxy 5  coninue Valproic Acid q8   Zofan 4mg IV PRN for vomitting   Activity: [x] OOB as tolerated with assistance    PULM:  Incentive spirometry, mobilize as tolerated    CV:  Keep -150mmHg    RENAL:  IVL  continue tamulsin  monitor I/Os for clots passed overnight consider bladder scan     GI:  Diet: regular  GI prophylaxis [x] not indicated [] PPI [] other:  Bowel regimen [] colace [x] senna [] other: miralax    ENDO:   Goal euglycemia (-180)    HEME/ONC:  VTE prophylaxis: [] SCDs [x] chemoprophylaxis [] hold chemoprophylaxis due to: [] high risk of DVT/PE on admission due to:  lovenox 40 sq  ID:  Anny-op antibiotics completed    MISC:      CODE STATUS:  [x] Full Code [] DNR [] DNI [] Palliative/Comfort Care    DISPOSITION: subacute rehab  [] ICU [] Stroke Unit [x] Floor [] EMU [] RCU [] PCU    will discuss with Dr. Jauregui    [id number] ASSESSMENT:  87M Hx HTN/HLD non-complaint with his meds, CVA w/R sided residual weakness on DAPT but allegedly not taken last week, HTN presents after found down bathroom floor found to have L SDH on 4/28 at [facility], transferred to [facility] for left callie hole on 4/29. Subdural drain discontinued 5/1/23. Patient downgraded from NSCU to floor 5/4/23.     NEURO:  neurocheck q4hrs  Pain control with Tylenol and oxy 5  seizures ppx: continue Valproic Acid 500q8 x7 days, will end 5/6   vEEG negative for seizures  Zofan 4mg IV PRN for vomiting  PT/OT -recommending TESFAYE  Activity: [x] OOB as tolerated with assistance    PULM:  Incentive spirometry, mobilize as tolerated, on room air    CV:  Keep -150mmHg    RENAL:  IVL  continue tamsulosin for BPH  monitor I/Os for clots passed overnight consider bladder scan, retaining <300cc.     GI:  Diet: regular  GI prophylaxis [x] not indicated [] PPI [] other:  Bowel regimen [] colace [x] senna [] other: miralax    ENDO:   Goal euglycemia (-180)    HEME/ONC:  VTE prophylaxis: [x] SCDs [x] chemoprophylaxis [] hold chemoprophylaxis due to: [] high risk of DVT/PE on admission due to:  lovenox 40 sq    ID:  Anny-op antibiotics complete      CODE STATUS:  [x] Full Code [] DNR [] DNI [] Palliative/Comfort Care    DISPOSITION: subacute rehab  [] ICU [] Stroke Unit [x] Floor [] EMU [] RCU [] PCU    will discuss with Dr. Jauregui    [id number]

## 2023-05-04 NOTE — DISCHARGE NOTE PROVIDER - CARE PROVIDER_API CALL
Patient insurance change and he needs his LamoTRIgine to be transfer to [facility] on [address].Patient only have 1 pill left for tonight and would like this fill TODAY.   Phyllis Jauregui (MD)  Neurosurgery  [address], [address]  [place] [address]  Phone: [phone]  Fax: [phone]  Follow Up Time:

## 2023-05-04 NOTE — PROVIDER CONTACT NOTE (OTHER) - ASSESSMENT
Blood noted at end of Noriega after each attempt
Pt A&Ox3, disoriented to situation. Vital signs stable, no complaints of pain.
PT denies discomfort PT has no urge to void

## 2023-05-04 NOTE — PROVIDER CONTACT NOTE (OTHER) - BACKGROUND
PT S/P Cristy shaina
Pt admitted for SDH, s/p L callie holes. Pt mccullough catheter was discontinued and passed trial to void at approximately 22:30.
PT is a traumatic SDH. PT is refusing straight cath
PT unable to void. Unable to straight cath PT due to resistance

## 2023-05-04 NOTE — DISCHARGE NOTE PROVIDER - NSDCCPCAREPLAN_GEN_ALL_CORE_FT
PRINCIPAL DISCHARGE DIAGNOSIS  Diagnosis: SDH (subdural hematoma)  Assessment and Plan of Treatment: s/p left callie hole evacuation 4/29/23  Please follow up with Dr. Jauregui in 1-2 weeks. you may call the office to make an appointment at your earliest convenience.      SECONDARY DISCHARGE DIAGNOSES  Diagnosis: Urinary retention  Assessment and Plan of Treatment: Please follow up with PCP or Urologist in 1-2 weeks.   Continue Tamsulosin.

## 2023-05-04 NOTE — PROGRESS NOTE ADULT - SUBJECTIVE AND OBJECTIVE BOX
CHIEF COMPLAINT: Patient examined at bedside, sitting up comfortable in bed, NAD. Patient reports improvement in voiding, denies additional clots since overnight. Denies headache, nausea, vomiting, dizziness, chest pain or abdominal pain.     OVERNIGHT EVENTS: Patient reports voiding with blood clots which resolved in the morning    Vital Signs Last 24 Hrs  T(C): 36.8 (04 May 2023 06:02), Max: 37.1 (03 May 2023 19:00)  T(F): 98.3 (04 May 2023 06:02), Max: 98.8 (03 May 2023 19:00)  HR: 90 (04 May 2023 06:02) (77 - 90)  BP: 137/74 (04 May 2023 06:02) (114/66 - 162/73)  BP(mean): 81 (03 May 2023 19:00) (78 - 81)  RR: 18 (04 May 2023 06:02) (13 - 20)  SpO2: 93% (04 May 2023 06:02) (93% - 100%)    Parameters below as of 04 May 2023 06:02  Patient On (Oxygen Delivery Method): room air        PHYSICAL EXAM:    General: No Acute Distress     Neurological:  A&O x2-3 (to name and year/month), Following Commands, PERRL, EOMI, Face Symmetrical, Speech Fluent Creole speaking, Moving all extremities, Upper extremities 5/5, lower extremities effort dependent 4+/5, No Drift, Sensation to Light Touch Intact    Pulmonary:  no audible stridor, Rhonchi, or Wheezing    Cardiovascular:  Regular Rate and Rhythm     Gastrointestinal: Soft, Nontender, Nondistended     Incision: stables c/d/i    LABS:                        12.9   6.91  )-----------( 340      ( 04 May 2023 06:54 )             40.1    05-04    140  |  106  |  13  ----------------------------<  88  4.0   |  22  |  0.86    Ca    9.6      04 May 2023 06:55  Phos  2.9     05-04  Mg     2.2     05-04 05-03 @ 07:01  -  05-04 @ 07:00  --------------------------------------------------------  IN: 300 mL / OUT: 550 mL / NET: -250 mL        MEDICATIONS:  Anticoagulation:  enoxaparin Injectable 40 milliGRAM(s) SubCutaneous <User Schedule>    Neuro:  acetaminophen     Tablet .. 650 milliGRAM(s) Oral every 6 hours PRN Temp greater or equal to 38C (100.4F), Mild Pain (1 - 3)  ondansetron Injectable 4 milliGRAM(s) IV Push every 6 hours PRN Nausea and/or Vomiting  oxyCODONE    IR 5 milliGRAM(s) Oral every 4 hours PRN Moderate Pain (4 - 6)  valproic  acid Syrup 500 milliGRAM(s) Oral every 8 hours      GI/:  polyethylene glycol 3350 17 Gram(s) Oral every 12 hours  senna 2 Tablet(s) Oral at bedtime  tamsulosin 0.4 milliGRAM(s) Oral at bedtime    Other:   multivitamin 1 Tablet(s) Oral daily    DIET: [x] Regular [] CCD [] Renal [] Puree [] Dysphagia [] Tube Feeds:     IMAGING:   < from: CT Head No Cont (05.01.23 @ 08:41) >    IMPRESSION:  Left-sided residual subdural hematoma with air fluid and   residual subacute and more fluidlike components. Still with small amount   of acute hemorrhage layering in the left parietal occipital region as   seen on the prior as well. The subdural drain has been removed. Still   with mass effect on the left lateral ventricle with roughly 5 mm midline   shift to the right.    --- End of Report ---    < end of copied text >   CHIEF COMPLAINT: Patient examined at bedside, sitting up comfortable in bed, NAD. Patient reports improvement in voiding, denies additional clots since overnight. Denies headache, nausea, vomiting, dizziness, chest pain or abdominal pain. Patient hard of hearing.   Patient speaks Creole,  used [id number].  OVERNIGHT EVENTS: Patient reports voiding with blood clots which resolved in the morning    Vital Signs Last 24 Hrs  T(C): 36.8 (04 May 2023 06:02), Max: 37.1 (03 May 2023 19:00)  T(F): 98.3 (04 May 2023 06:02), Max: 98.8 (03 May 2023 19:00)  HR: 90 (04 May 2023 06:02) (77 - 90)  BP: 137/74 (04 May 2023 06:02) (114/66 - 162/73)  BP(mean): 81 (03 May 2023 19:00) (78 - 81)  RR: 18 (04 May 2023 06:02) (13 - 20)  SpO2: 93% (04 May 2023 06:02) (93% - 100%)    Parameters below as of 04 May 2023 06:02  Patient On (Oxygen Delivery Method): room air        PHYSICAL EXAM:    General: No Acute Distress     Neurological:  A&O x2-3 (to name and year/month), hard of hearing, Following Commands, PERRL, EOMI, Face Symmetrical, Speech Fluent Creole speaking, Moving all extremities, Upper extremities 5/5, lower extremities effort dependent 4+/5, No Drift, Sensation to Light Touch Intact    Pulmonary:  no audible stridor, Rhonchi, or Wheezing    Cardiovascular:  Regular Rate and Rhythm     Gastrointestinal: Soft, Nontender, Nondistended     Incision: stables c/d/i    LABS:                        12.9   6.91  )-----------( 340      ( 04 May 2023 06:54 )             40.1    05-04    140  |  106  |  13  ----------------------------<  88  4.0   |  22  |  0.86    Ca    9.6      04 May 2023 06:55  Phos  2.9     05-04  Mg     2.2     05-04 05-03 @ 07:01  -  05-04 @ 07:00  --------------------------------------------------------  IN: 300 mL / OUT: 550 mL / NET: -250 mL        MEDICATIONS:  Anticoagulation:  enoxaparin Injectable 40 milliGRAM(s) SubCutaneous <User Schedule>    Neuro:  acetaminophen     Tablet .. 650 milliGRAM(s) Oral every 6 hours PRN Temp greater or equal to 38C (100.4F), Mild Pain (1 - 3)  ondansetron Injectable 4 milliGRAM(s) IV Push every 6 hours PRN Nausea and/or Vomiting  oxyCODONE    IR 5 milliGRAM(s) Oral every 4 hours PRN Moderate Pain (4 - 6)  valproic  acid Syrup 500 milliGRAM(s) Oral every 8 hours      GI/:  polyethylene glycol 3350 17 Gram(s) Oral every 12 hours  senna 2 Tablet(s) Oral at bedtime  tamsulosin 0.4 milliGRAM(s) Oral at bedtime    Other:   multivitamin 1 Tablet(s) Oral daily    DIET: [x] Regular [] CCD [] Renal [] Puree [] Dysphagia [] Tube Feeds:     IMAGING:   < from: CT Head No Cont (05.01.23 @ 08:41) >    IMPRESSION:  Left-sided residual subdural hematoma with air fluid and   residual subacute and more fluidlike components. Still with small amount   of acute hemorrhage layering in the left parietal occipital region as   seen on the prior as well. The subdural drain has been removed. Still   with mass effect on the left lateral ventricle with roughly 5 mm midline   shift to the right.    --- End of Report ---    < end of copied text >

## 2023-05-04 NOTE — PROVIDER CONTACT NOTE (OTHER) - ACTION/TREATMENT ORDERED:
PT is AO x 3 PT is refusing straight cath will continue to assess and bladder scan PT later on during tour. No changes in treatment at this time will continue to assess
NSCU team made aware Urology consulted. No changes in treatment at this time will continue to assess
NSCU team  made aware diet advanced will monitor first meal with PT
MD Blank Tesfaye assessed pt at bedside, bladder scan performed, pt retaining 120ml. Will repeat bladder scan at 4:30. May consult urology during the day.

## 2023-05-04 NOTE — PROVIDER CONTACT NOTE (OTHER) - SITUATION
Bladder scan shows that PT is retaining 423ML of urine in his bladder
PT is still retaining urine. Bladder scan is 470. Unable to straight cath PT due to resistance 2 attempts made
PT passed bed side dysphagia.
Pt bleeding from penis. Pt found sitting up at side of bed attempting to urinate. PCA noticed bleeding from penis on bed and on floor.

## 2023-05-05 RX ADMIN — Medication 500 MILLIGRAM(S): at 22:48

## 2023-05-05 RX ADMIN — Medication 1 TABLET(S): at 12:00

## 2023-05-05 RX ADMIN — POLYETHYLENE GLYCOL 3350 17 GRAM(S): 17 POWDER, FOR SOLUTION ORAL at 05:49

## 2023-05-05 RX ADMIN — ENOXAPARIN SODIUM 40 MILLIGRAM(S): 100 INJECTION SUBCUTANEOUS at 17:16

## 2023-05-05 RX ADMIN — Medication 500 MILLIGRAM(S): at 05:47

## 2023-05-05 RX ADMIN — Medication 500 MILLIGRAM(S): at 14:00

## 2023-05-05 RX ADMIN — TAMSULOSIN HYDROCHLORIDE 0.4 MILLIGRAM(S): 0.4 CAPSULE ORAL at 22:48

## 2023-05-05 NOTE — PROGRESS NOTE ADULT - SUBJECTIVE AND OBJECTIVE BOX
CHIEF COMPLAINT: Patient examined at bedside, sitting up in NAD. Denies HA, n/v, dizziness, change in vision,  difficulty voiding, or abdominal pain.   family at bedside    OVERNIGHT EVENTS: none    Vital Signs Last 24 Hrs  T(C): 36.7 (05 May 2023 09:05), Max: 36.8 (05 May 2023 01:13)  T(F): 98.1 (05 May 2023 09:05), Max: 98.3 (05 May 2023 01:13)  HR: 92 (05 May 2023 09:05) (72 - 92)  BP: 121/66 (05 May 2023 09:05) (121/66 - 170/71)  BP(mean): --  RR: 18 (05 May 2023 09:05) (16 - 20)  SpO2: 98% (05 May 2023 09:05) (95% - 98%)    Parameters below as of 05 May 2023 09:05  Patient On (Oxygen Delivery Method): room air        DRAINS: [] SUYAPA (cc/24h) [] HMV (cc/24h)    PHYSICAL EXAM:    General: No Acute Distress, [place]    Neurological: A&Ox3, Following Commands, PERRL, EOMI, Face Symmetrical, Speech Fluent, Creole speaking, Moving all extremities, upper extremities 5/5, lower extremities 4+/5 effort dependent.   No Drift, Sensation to Light Touch Intact    Pulmonary: No Rales, No Rhonchi, No Wheezes     Cardiovascular: Regular Rate and Rhythm     Gastrointestinal: Soft, Nontender, Nondistended     Incision: staples c/d/i    LABS:                        12.9   6.91  )-----------( 340      ( 04 May 2023 06:54 )             40.1    05-04    140  |  106  |  13  ----------------------------<  88  4.0   |  22  |  0.86    Ca    9.6      04 May 2023 06:55  Phos  2.9     05-04  Mg     2.2     05-04 05-04 @ 07:01  -  05-05 @ 07:00  --------------------------------------------------------  IN: 340 mL / OUT: 0 mL / NET: 340 mL        MEDICATIONS:  Anticoagulation:  enoxaparin Injectable 40 milliGRAM(s) SubCutaneous <User Schedule>    Neuro:  acetaminophen     Tablet .. 650 milliGRAM(s) Oral every 6 hours PRN Temp greater or equal to 38C (100.4F), Mild Pain (1 - 3)  ondansetron Injectable 4 milliGRAM(s) IV Push every 6 hours PRN Nausea and/or Vomiting  oxyCODONE    IR 5 milliGRAM(s) Oral every 4 hours PRN Moderate Pain (4 - 6)  valproic  acid Syrup 500 milliGRAM(s) Oral every 8 hours    GI/:  polyethylene glycol 3350 17 Gram(s) Oral every 12 hours  senna 2 Tablet(s) Oral at bedtime  tamsulosin 0.4 milliGRAM(s) Oral at bedtime    Other:   multivitamin 1 Tablet(s) Oral daily    DIET: [x] Regular [] CCD [] Renal [] Puree [] Dysphagia [] Tube Feeds:     IMAGING:   < from: CT Head No Cont (05.01.23 @ 08:41) >  PROCEDURE DATE:  05/01/2023      IMPRESSION:  Left-sided residual subdural hematoma with air fluid and   residual subacute and more fluidlike components. Still with small amount   of acute hemorrhage layering in the left parietal occipital region as   seen on the prior as well. The subdural drain has been removed. Still   with mass effect on the left lateral ventricle with roughly 5 mm midline   shift to the right.    --- End of Report ---    < end of copied text >

## 2023-05-05 NOTE — PROGRESS NOTE ADULT - ASSESSMENT
ASSESSMENT:  87M Hx HTN/HLD non-complaint with his meds, CVA w/R sided residual weakness on DAPT but allegedly not taken last week, HTN presents after found down bathroom floor found to have L SDH on 4/28 at [facility], transferred to [facility] for left callie hole on 4/29. Subdural drain discontinued 5/1/23. Patient downgraded from NSCU to floor 5/4/23.     NEURO:  neurocheck q4hrs  Pain control with Tylenol and oxy 5  seizures ppx: continue Valproic Acid 500q8 x7 days, ends 5/6  vEEG negative for seizures  Zofan 4mg IV PRN for vomiting  PT/OT -recommending TESFAYE  Activity: [x] OOB as tolerated with assistance    PULM:  Incentive spirometry, mobilize as tolerated, on room air    CV:  Keep -150mmHg    RENAL:  IVL  continue tamsulosin for BPH  monitor I/Os, voiding well     GI:  Diet: regular  GI prophylaxis [x] not indicated [] PPI [] other:  Bowel regimen [] colace [x] senna [] other: miralax    ENDO:   Goal euglycemia (-180)    HEME/ONC:  VTE prophylaxis: [x] SCDs [x] chemoprophylaxis [] hold chemoprophylaxis due to: [] high risk of DVT/PE on admission due to:  lovenox 40 sq    ID:  Anny-op antibiotics complete      CODE STATUS:  [x] Full Code [] DNR [] DNI [] Palliative/Comfort Care    DISPOSITION: pending [facility] authorization, d/w family and        will discuss with Dr. Jauregui    [id number]

## 2023-05-06 LAB
ANION GAP SERPL CALC-SCNC: 11 MMOL/L — SIGNIFICANT CHANGE UP (ref 5–17)
BUN SERPL-MCNC: 11 MG/DL — SIGNIFICANT CHANGE UP (ref 7–23)
CALCIUM SERPL-MCNC: 9.7 MG/DL — SIGNIFICANT CHANGE UP (ref 8.4–10.5)
CHLORIDE SERPL-SCNC: 101 MMOL/L — SIGNIFICANT CHANGE UP (ref 96–108)
CO2 SERPL-SCNC: 26 MMOL/L — SIGNIFICANT CHANGE UP (ref 22–31)
CREAT SERPL-MCNC: 0.89 MG/DL — SIGNIFICANT CHANGE UP (ref 0.5–1.3)
EGFR: 84 ML/MIN/1.73M2 — SIGNIFICANT CHANGE UP
GLUCOSE SERPL-MCNC: 81 MG/DL — SIGNIFICANT CHANGE UP (ref 70–99)
HCT VFR BLD CALC: 41.6 % — SIGNIFICANT CHANGE UP (ref 39–50)
HGB BLD-MCNC: 13 G/DL — SIGNIFICANT CHANGE UP (ref 13–17)
MCHC RBC-ENTMCNC: 28.6 PG — SIGNIFICANT CHANGE UP (ref 27–34)
MCHC RBC-ENTMCNC: 31.3 GM/DL — LOW (ref 32–36)
MCV RBC AUTO: 91.6 FL — SIGNIFICANT CHANGE UP (ref 80–100)
NRBC # BLD: 0 /100 WBCS — SIGNIFICANT CHANGE UP (ref 0–0)
PLATELET # BLD AUTO: 387 K/UL — SIGNIFICANT CHANGE UP (ref 150–400)
POTASSIUM SERPL-MCNC: 4.3 MMOL/L — SIGNIFICANT CHANGE UP (ref 3.5–5.3)
POTASSIUM SERPL-SCNC: 4.3 MMOL/L — SIGNIFICANT CHANGE UP (ref 3.5–5.3)
RBC # BLD: 4.54 M/UL — SIGNIFICANT CHANGE UP (ref 4.2–5.8)
RBC # FLD: 13.5 % — SIGNIFICANT CHANGE UP (ref 10.3–14.5)
SODIUM SERPL-SCNC: 138 MMOL/L — SIGNIFICANT CHANGE UP (ref 135–145)
WBC # BLD: 6.5 K/UL — SIGNIFICANT CHANGE UP (ref 3.8–10.5)
WBC # FLD AUTO: 6.5 K/UL — SIGNIFICANT CHANGE UP (ref 3.8–10.5)

## 2023-05-06 RX ORDER — AMLODIPINE BESYLATE 2.5 MG/1
5 TABLET ORAL DAILY
Refills: 0 | Status: DISCONTINUED | OUTPATIENT
Start: 2023-05-06 | End: 2023-05-08

## 2023-05-06 RX ADMIN — AMLODIPINE BESYLATE 5 MILLIGRAM(S): 2.5 TABLET ORAL at 17:37

## 2023-05-06 RX ADMIN — POLYETHYLENE GLYCOL 3350 17 GRAM(S): 17 POWDER, FOR SOLUTION ORAL at 17:37

## 2023-05-06 RX ADMIN — Medication 500 MILLIGRAM(S): at 06:21

## 2023-05-06 RX ADMIN — TAMSULOSIN HYDROCHLORIDE 0.4 MILLIGRAM(S): 0.4 CAPSULE ORAL at 22:18

## 2023-05-06 RX ADMIN — Medication 650 MILLIGRAM(S): at 17:37

## 2023-05-06 RX ADMIN — SENNA PLUS 2 TABLET(S): 8.6 TABLET ORAL at 22:18

## 2023-05-06 RX ADMIN — Medication 1 TABLET(S): at 17:37

## 2023-05-06 RX ADMIN — ENOXAPARIN SODIUM 40 MILLIGRAM(S): 100 INJECTION SUBCUTANEOUS at 17:37

## 2023-05-06 RX ADMIN — Medication 650 MILLIGRAM(S): at 18:00

## 2023-05-06 NOTE — PROGRESS NOTE ADULT - ASSESSMENT
ASSESSMENT AND PLAN: 87 year old male Hx HTN/HLD non-complaint with his meds, CVA w/R sided residual weakness on DAPT but allegedly not taken last week, HTN presents after found down bathroom floor. Pt underwent Lt callie hole for evacuataion of SDH on 4/29. SD SUYAPA was removed on 5/1.     NEURO: Pain Medication: continue tylenol and oxycodone     PULM: Encouraged incentive spirometer     CV: Hypertensive at times, Home was on Norvasc and restarted on 5mg QD with hold parameters     ENDO:     HEME/ONC:                      DVT ppx: SQL     RENAL: Continue flomax     ID: Afebrile     GI:  Continue miralax and senna for GI ppx     DISCHARGE PLANNING: PT: DAVION HUA   Will D/w Neurosurgeon

## 2023-05-06 NOTE — PROGRESS NOTE ADULT - SUBJECTIVE AND OBJECTIVE BOX
SUBJECTIVE: Pt seen and examined. Pt denies any complaints.     Vital Signs Last 24 Hrs  T(C): 37 (06 May 2023 12:44), Max: 37 (05 May 2023 17:11)  T(F): 98.6 (06 May 2023 12:44), Max: 98.6 (05 May 2023 17:11)  HR: 76 (06 May 2023 12:44) (75 - 102)  BP: 156/72 (06 May 2023 12:44) (124/66 - 174/92)  RR: 18 (06 May 2023 12:44) (18 - 18)  SpO2: 98% (06 May 2023 12:44) (97% - 100%)    Parameters below as of 06 May 2023 12:44  Patient On (Oxygen Delivery Method): room air                        13.0   6.50  )-----------( 387      ( 06 May 2023 07:39 )             41.6    05-06    138  |  101  |  11  ----------------------------<  81  4.3   |  26  |  0.89    Ca    9.7      06 May 2023 07:39    Stroke Core Measures     NEUROIMAGING: < from: CT Head No Cont (05.01.23 @ 08:41) >  IMPRESSION:  Left-sided residual subdural hematoma with air fluid and   residual subacute and more fluidlike components. Still with small amount   of acute hemorrhage layering in the left parietal occipital region as   seen on the prior as well. The subdural drain has been removed. Still   with mass effect on the left lateral ventricle with roughly 5 mm midline   shift to the right.    < end of copied text >    PHYSICAL EXAM:    General: No Acute Distress     Neurological: Awake, alert oriented to person, place and time, Following Commands, PERRL, EOMI, Face Symmetrical, Speech Fluent, Moving all extremities, Muscle Strength normal in all four extremities, No Drift, Sensation to Light Touch Intact    Pulmonary: Clear to Auscultation, No Rales, No Rhonchi, No Wheezes     Cardiovascular: S1, S2, Regular Rate and Rhythm     Gastrointestinal: Soft, Nontender, Nondistended     Incision: Staples in place. Clean and dry     MEDICATIONS:   Antibiotics:    Neuro:  acetaminophen     Tablet .. 650 milliGRAM(s) Oral every 6 hours PRN Temp greater or equal to 38C (100.4F), Mild Pain (1 - 3)  oxyCODONE    IR 5 milliGRAM(s) Oral every 4 hours PRN Moderate Pain (4 - 6)    Anticoagulation:  enoxaparin Injectable 40 milliGRAM(s) SubCutaneous <User Schedule>    Cardiology:    Endo:     Pulm:    GI/:  polyethylene glycol 3350 17 Gram(s) Oral every 12 hours  senna 2 Tablet(s) Oral at bedtime  tamsulosin 0.4 milliGRAM(s) Oral at bedtime    Other:  multivitamin 1 Tablet(s) Oral daily

## 2023-05-07 LAB
SARS-COV-2 RNA SPEC QL NAA+PROBE: SIGNIFICANT CHANGE UP
SARS-COV-2 RNA SPEC QL NAA+PROBE: SIGNIFICANT CHANGE UP

## 2023-05-07 RX ADMIN — AMLODIPINE BESYLATE 5 MILLIGRAM(S): 2.5 TABLET ORAL at 05:41

## 2023-05-07 RX ADMIN — ENOXAPARIN SODIUM 40 MILLIGRAM(S): 100 INJECTION SUBCUTANEOUS at 17:58

## 2023-05-07 RX ADMIN — TAMSULOSIN HYDROCHLORIDE 0.4 MILLIGRAM(S): 0.4 CAPSULE ORAL at 21:21

## 2023-05-07 RX ADMIN — Medication 1 TABLET(S): at 11:59

## 2023-05-07 RX ADMIN — POLYETHYLENE GLYCOL 3350 17 GRAM(S): 17 POWDER, FOR SOLUTION ORAL at 05:41

## 2023-05-07 RX ADMIN — SENNA PLUS 2 TABLET(S): 8.6 TABLET ORAL at 21:21

## 2023-05-07 NOTE — PROGRESS NOTE ADULT - ASSESSMENT
ASSESSMENT AND PLAN: 87 year old male Hx HTN/HLD non-complaint with his meds, CVA w/R sided residual weakness on DAPT but allegedly not taken last week, HTN presents after found down bathroom floor. Pt found to have SDH. Pt underwent Lt callie hole for evacuataion of SDH on 4/29. SD SUYAPA was removed on 5/1. POD#8    NEURO: Pain Medication: continue tylenol and oxycodone     PULM: Encouraged incentive spirometer     CV: Hypertensive at times, Home Norvasc was started at 5mg QD. BP this am improved     ENDO:     HEME/ONC:                      DVT ppx: SQL     RENAL: Continue flomax     ID: Afebrile     GI:  Continue miralax and senna for GI ppx     DISCHARGE PLANNING: PT: AR -P. Covid swab sent today with anticipation to leave tomorrow.    Will D/w Neurosurgeon

## 2023-05-07 NOTE — PROGRESS NOTE ADULT - SUBJECTIVE AND OBJECTIVE BOX
SUBJECTIVE: Pt seen and examined. Pt denies any complaints, patient is more co-operative today      Vital Signs Last 24 Hrs  T(C): 37 (07 May 2023 08:16), Max: 37 (06 May 2023 12:44)  T(F): 98.6 (07 May 2023 08:16), Max: 98.6 (06 May 2023 12:44)  HR: 78 (07 May 2023 08:16) (71 - 86)  BP: 135/69 (07 May 2023 08:16) (135/69 - 174/71)  RR: 18 (07 May 2023 08:16) (18 - 18)  SpO2: 98% (07 May 2023 08:16) (94% - 98%)    Parameters below as of 07 May 2023 08:16  Patient On (Oxygen Delivery Method): room air                        13.0   6.50  )-----------( 387      ( 06 May 2023 07:39 )             41.6    05-06    138  |  101  |  11  ----------------------------<  81  4.3   |  26  |  0.89    Ca    9.7      06 May 2023 07:39     PHYSICAL EXAM: Seen with creole  [id number]. Pt minimally compliant with exam     General: No Acute Distress     Neurological: Awake, alert oriented to person, place and time, Following Commands, EOMI, Speech Fluent, Moving all extremities, Muscle Strength normal in all four extremities, No Drift, Sensation to Light Touch Intact    Pulmonary: Clear to Auscultation, No Rales, No Rhonchi, No Wheezes     Cardiovascular: S1, S2, Regular Rate and Rhythm     Gastrointestinal: Soft, Nontender, Nondistended     Incision: Staples in place. Clean and dry     MEDICATIONS:   Antibiotics:    Neuro:  acetaminophen     Tablet .. 650 milliGRAM(s) Oral every 6 hours PRN Temp greater or equal to 38C (100.4F), Mild Pain (1 - 3)    Anticoagulation:  enoxaparin Injectable 40 milliGRAM(s) SubCutaneous <User Schedule>    Cardiology:  amLODIPine   Tablet 5 milliGRAM(s) Oral daily    Endo:     Pulm:    GI/:  polyethylene glycol 3350 17 Gram(s) Oral every 12 hours  senna 2 Tablet(s) Oral at bedtime  tamsulosin 0.4 milliGRAM(s) Oral at bedtime    Other:  multivitamin 1 Tablet(s) Oral daily   SUBJECTIVE: Pt seen and examined. Pt denies any complaints, patient is more co-operative today      Vital Signs Last 24 Hrs  T(C): 37 (07 May 2023 08:16), Max: 37 (06 May 2023 12:44)  T(F): 98.6 (07 May 2023 08:16), Max: 98.6 (06 May 2023 12:44)  HR: 78 (07 May 2023 08:16) (71 - 86)  BP: 135/69 (07 May 2023 08:16) (135/69 - 174/71)  RR: 18 (07 May 2023 08:16) (18 - 18)  SpO2: 98% (07 May 2023 08:16) (94% - 98%)    Parameters below as of 07 May 2023 08:16  Patient On (Oxygen Delivery Method): room air                        13.0   6.50  )-----------( 387      ( 06 May 2023 07:39 )             41.6    05-06    138  |  101  |  11  ----------------------------<  81  4.3   |  26  |  0.89    Ca    9.7      06 May 2023 07:39     PHYSICAL EXAM: Seen with creole  [id number]. Pt minimally compliant with exam     General: No Acute Distress     Neurological: Lethargic, but easily arousabile and stays awake, alert oriented to person, place and time, Following Commands, EOMI, Speech Fluent, Moving all extremities, Muscle Strength normal in all four extremities, No Drift, Sensation to Light Touch Intact    Pulmonary: Clear to Auscultation, No Rales, No Rhonchi, No Wheezes     Cardiovascular: S1, S2, Regular Rate and Rhythm     Gastrointestinal: Soft, Nontender, Nondistended     Incision: Staples in place. Clean and dry     MEDICATIONS:   Antibiotics:    Neuro:  acetaminophen     Tablet .. 650 milliGRAM(s) Oral every 6 hours PRN Temp greater or equal to 38C (100.4F), Mild Pain (1 - 3)    Anticoagulation:  enoxaparin Injectable 40 milliGRAM(s) SubCutaneous <User Schedule>    Cardiology:  amLODIPine   Tablet 5 milliGRAM(s) Oral daily    Endo:     Pulm:    GI/:  polyethylene glycol 3350 17 Gram(s) Oral every 12 hours  senna 2 Tablet(s) Oral at bedtime  tamsulosin 0.4 milliGRAM(s) Oral at bedtime    Other:  multivitamin 1 Tablet(s) Oral daily

## 2023-05-08 ENCOUNTER — TRANSCRIPTION ENCOUNTER (OUTPATIENT)
Age: 88
End: 2023-05-08

## 2023-05-08 VITALS
HEART RATE: 83 BPM | TEMPERATURE: 98 F | OXYGEN SATURATION: 96 % | SYSTOLIC BLOOD PRESSURE: 154 MMHG | RESPIRATION RATE: 18 BRPM | DIASTOLIC BLOOD PRESSURE: 81 MMHG

## 2023-05-08 PROCEDURE — 86850 RBC ANTIBODY SCREEN: CPT

## 2023-05-08 PROCEDURE — 72125 CT NECK SPINE W/O DYE: CPT

## 2023-05-08 PROCEDURE — 82803 BLOOD GASES ANY COMBINATION: CPT

## 2023-05-08 PROCEDURE — 86901 BLOOD TYPING SEROLOGIC RH(D): CPT

## 2023-05-08 PROCEDURE — 85396 CLOTTING ASSAY WHOLE BLOOD: CPT

## 2023-05-08 PROCEDURE — 97530 THERAPEUTIC ACTIVITIES: CPT

## 2023-05-08 PROCEDURE — 97535 SELF CARE MNGMENT TRAINING: CPT

## 2023-05-08 PROCEDURE — 82947 ASSAY GLUCOSE BLOOD QUANT: CPT

## 2023-05-08 PROCEDURE — 95700 EEG CONT REC W/VID EEG TECH: CPT

## 2023-05-08 PROCEDURE — 85025 COMPLETE CBC W/AUTO DIFF WBC: CPT

## 2023-05-08 PROCEDURE — 36415 COLL VENOUS BLD VENIPUNCTURE: CPT

## 2023-05-08 PROCEDURE — 70450 CT HEAD/BRAIN W/O DYE: CPT

## 2023-05-08 PROCEDURE — 80053 COMPREHEN METABOLIC PANEL: CPT

## 2023-05-08 PROCEDURE — 84295 ASSAY OF SERUM SODIUM: CPT

## 2023-05-08 PROCEDURE — 82435 ASSAY OF BLOOD CHLORIDE: CPT

## 2023-05-08 PROCEDURE — C1889: CPT

## 2023-05-08 PROCEDURE — 86900 BLOOD TYPING SEROLOGIC ABO: CPT

## 2023-05-08 PROCEDURE — U0005: CPT

## 2023-05-08 PROCEDURE — 97110 THERAPEUTIC EXERCISES: CPT

## 2023-05-08 PROCEDURE — 85014 HEMATOCRIT: CPT

## 2023-05-08 PROCEDURE — 85576 BLOOD PLATELET AGGREGATION: CPT

## 2023-05-08 PROCEDURE — 84132 ASSAY OF SERUM POTASSIUM: CPT

## 2023-05-08 PROCEDURE — 85730 THROMBOPLASTIN TIME PARTIAL: CPT

## 2023-05-08 PROCEDURE — 84100 ASSAY OF PHOSPHORUS: CPT

## 2023-05-08 PROCEDURE — 83735 ASSAY OF MAGNESIUM: CPT

## 2023-05-08 PROCEDURE — 85610 PROTHROMBIN TIME: CPT

## 2023-05-08 PROCEDURE — 85027 COMPLETE CBC AUTOMATED: CPT

## 2023-05-08 PROCEDURE — 80048 BASIC METABOLIC PNL TOTAL CA: CPT

## 2023-05-08 PROCEDURE — 97165 OT EVAL LOW COMPLEX 30 MIN: CPT

## 2023-05-08 PROCEDURE — 95714 VEEG EA 12-26 HR UNMNTR: CPT

## 2023-05-08 PROCEDURE — 97161 PT EVAL LOW COMPLEX 20 MIN: CPT

## 2023-05-08 PROCEDURE — 83605 ASSAY OF LACTIC ACID: CPT

## 2023-05-08 PROCEDURE — 99285 EMERGENCY DEPT VISIT HI MDM: CPT

## 2023-05-08 PROCEDURE — 85018 HEMOGLOBIN: CPT

## 2023-05-08 PROCEDURE — 97116 GAIT TRAINING THERAPY: CPT

## 2023-05-08 PROCEDURE — 82330 ASSAY OF CALCIUM: CPT

## 2023-05-08 PROCEDURE — U0003: CPT

## 2023-05-08 PROCEDURE — 93970 EXTREMITY STUDY: CPT

## 2023-05-08 RX ORDER — SENNA PLUS 8.6 MG/1
2 TABLET ORAL
Qty: 0 | Refills: 0 | DISCHARGE
Start: 2023-05-08

## 2023-05-08 RX ORDER — POLYETHYLENE GLYCOL 3350 17 G/17G
17 POWDER, FOR SOLUTION ORAL
Qty: 0 | Refills: 0 | DISCHARGE
Start: 2023-05-08

## 2023-05-08 RX ORDER — ACETAMINOPHEN 500 MG
2 TABLET ORAL
Qty: 0 | Refills: 0 | DISCHARGE
Start: 2023-05-08

## 2023-05-08 RX ADMIN — ENOXAPARIN SODIUM 40 MILLIGRAM(S): 100 INJECTION SUBCUTANEOUS at 17:06

## 2023-05-08 RX ADMIN — AMLODIPINE BESYLATE 5 MILLIGRAM(S): 2.5 TABLET ORAL at 05:29

## 2023-05-08 RX ADMIN — Medication 1 TABLET(S): at 11:48

## 2023-05-08 RX ADMIN — POLYETHYLENE GLYCOL 3350 17 GRAM(S): 17 POWDER, FOR SOLUTION ORAL at 05:30

## 2023-05-08 NOTE — PROGRESS NOTE ADULT - THIS PATIENT HAS THE FOLLOWING CONDITION(S)/DIAGNOSES ON THIS ADMISSION:
None
Brain Compression / Herniation
Brain Compression / Herniation
None
None
SDH
Brain Compression / Herniation
Encephalopathy
SDH
Brain Compression / Herniation
None
Brain Compression / Herniation
in setting of SDH/Brain Compression / Herniation

## 2023-05-08 NOTE — PROGRESS NOTE ADULT - PROVIDER SPECIALTY LIST ADULT
NSICU
Neurosurgery
Urology
NSICU
Neurosurgery
NSICU
NSICU
Neurosurgery
NSICU
Neurosurgery

## 2023-05-08 NOTE — PROGRESS NOTE ADULT - REASON FOR ADMISSION
AMS and weakness s/p fall this AM and a few weeks ago

## 2023-05-08 NOTE — PROGRESS NOTE ADULT - ASSESSMENT
HPI:  87M Hx HTN/HLD non-complaint with his meds, CVA w/R sided residual weakness on DAPT but allegedly not taken last week, HTN presents after found down bathroom floor.  Pt underwent Lt callie hole for evacuation of SDH on 4/29. SD SUYAPA was removed on 5/1.         PLAN:  Neuro:   - post op CT head stable  - tylenol prn pain  Respiratory:   - satting well on room air  CV:  - HTN- continue amlodipine  DVT ppx:   - sq lovenox  Renal:   - urinary retention- continue flomax  ID:   GI:    PT/OT:   Will discuss with ____      Spectralink # [id number] HPI:  87M Hx HTN/HLD non-complaint with his meds, CVA w/R sided residual weakness on DAPT but allegedly not taken last week, HTN presents after found down bathroom floor.  Pt underwent Lt callie hole for evacuation of SDH on 4/29. SD SUYAPA was removed on 5/1.         PLAN:  Neuro:   - post op CT head stable  - tylenol prn pain  Respiratory:   - satting well on room air  CV:  - HTN- continue amlodipine  DVT ppx:   - sq lovenox  Renal:   - urinary retention- continue flomax  GI:    - continue bowel regimen  PT/OT:   - TESFAYE      [facility] # [id number]

## 2023-05-08 NOTE — PROGRESS NOTE ADULT - SUBJECTIVE AND OBJECTIVE BOX
SUBJECTIVE: Pt seen and examined, resting comfortably    OVERNIGHT EVENTS: none    Vital Signs Last 24 Hrs  T(C): 36.9 (08 May 2023 10:26), Max: 36.9 (08 May 2023 10:26)  T(F): 98.4 (08 May 2023 10:26), Max: 98.4 (08 May 2023 10:26)  HR: 75 (08 May 2023 10:26) (75 - 86)  BP: 144/68 (08 May 2023 10:26) (116/72 - 153/66)  BP(mean): --  RR: 18 (08 May 2023 10:26) (18 - 18)  SpO2: 99% (08 May 2023 10:26) (96% - 99%)    Parameters below as of 08 May 2023 10:26  Patient On (Oxygen Delivery Method): room air        PHYSICAL EXAM:    General: No Acute Distress     Neurological: Awake, alert oriented to person, place and time, Following Commands, Face Symmetrical, Speech Fluent, Moving all extremities 5/5, No Drift    Pulmonary: Clear to Auscultation, No Rales, No Rhonchi, No Wheezes     Cardiovascular: S1, S2, Regular Rate and Rhythm     Gastrointestinal: Soft, Nontender, Nondistended     Incision: cranial staples c/d/i    LABS:             05-07 @ 07:01  -  05-08 @ 07:00  --------------------------------------------------------  IN: 120 mL / OUT: 0 mL / NET: 120 mL      DRAINS: none    MEDICATIONS:  Antibiotics:    Neuro:  acetaminophen     Tablet .. 650 milliGRAM(s) Oral every 6 hours PRN Temp greater or equal to 38C (100.4F), Mild Pain (1 - 3)  ondansetron Injectable 4 milliGRAM(s) IV Push every 6 hours PRN Nausea and/or Vomiting    Cardiac:  amLODIPine   Tablet 5 milliGRAM(s) Oral daily    Pulm:    GI/:  polyethylene glycol 3350 17 Gram(s) Oral every 12 hours  senna 2 Tablet(s) Oral at bedtime  tamsulosin 0.4 milliGRAM(s) Oral at bedtime    Other:   enoxaparin Injectable 40 milliGRAM(s) SubCutaneous <User Schedule>  multivitamin 1 Tablet(s) Oral daily    DIET: [x] Regular [] CCD [] Renal [] Puree [] Dysphagia [] Tube Feeds:     IMAGING:   < from: CT Head No Cont (05.01.23 @ 08:41) >  IMPRESSION:  Left-sided residual subdural hematoma with air fluid and   residual subacute and more fluidlike components. Still with small amount   of acute hemorrhage layering in the left parietal occipital region as   seen on the prior as well. The subdural drain has been removed. Still   with mass effect on the left lateral ventricle with roughly 5 mm midline   shift to the right.    --- End of Report ---    < end of copied text >  < from: [facility] Duplex Lower Ext Vein Scan, Bilat (04.30.23 @ 12:18) >    IMPRESSION:  No evidence of deep venous thrombosis in either lower extremity.    < end of copied text >

## 2023-05-08 NOTE — DISCHARGE NOTE NURSING/CASE MANAGEMENT/SOCIAL WORK - NSDCPEFALRISK_GEN_ALL_CORE
For information on Fall & Injury Prevention, visit: [email or web address] OR  [email or web address] OR  https://www.cdc.gov/steadi/patient.html

## 2023-09-13 RX ORDER — ASPIRIN/CALCIUM CARB/MAGNESIUM 324 MG
1 TABLET ORAL
Refills: 0 | DISCHARGE

## 2023-09-13 RX ORDER — TAMSULOSIN HYDROCHLORIDE 0.4 MG/1
1 CAPSULE ORAL
Refills: 0 | DISCHARGE

## 2023-09-13 RX ORDER — ATORVASTATIN CALCIUM 80 MG/1
1 TABLET, FILM COATED ORAL
Refills: 0 | DISCHARGE

## 2023-09-13 RX ORDER — AMLODIPINE BESYLATE 2.5 MG/1
1 TABLET ORAL
Refills: 0 | DISCHARGE

## 2023-09-13 RX ORDER — OXYBUTYNIN CHLORIDE 5 MG
1 TABLET ORAL
Refills: 0 | DISCHARGE

## 2024-01-19 NOTE — ED ADULT NURSE NOTE - RELATIONSHIP TO PATIENT
[FreeTextEntry1] : Elevated iron level-repeat CBC and CMP sent.  Additional labs to check for iron overload was sent as well. Was told based on results may require further testing and possible referral to hematology. Daughter

## (undated) DEVICE — SYR ASEPTO

## (undated) DEVICE — STAPLER SKIN VISI-STAT 35 WIDE

## (undated) DEVICE — DRAPE 1/2 SHEET 40X57"

## (undated) DEVICE — ELCTR 4-DISC 20MM 49" (RED, BLUE, GREEN, BLACK)

## (undated) DEVICE — DRAPE SURGICAL #1010

## (undated) DEVICE — GLV 8 PROTEXIS (WHITE)

## (undated) DEVICE — DRAIN SILICONE DRAIN

## (undated) DEVICE — GLV 7 PROTEXIS (WHITE)

## (undated) DEVICE — TUBING SUCTION 20FT

## (undated) DEVICE — SYR LUER LOK 20CC

## (undated) DEVICE — ELCTR SUBDERMAL NDL CLASSIC 1.5M X 59" (6 COLOR)

## (undated) DEVICE — SPECIMEN CONTAINER 100ML

## (undated) DEVICE — MIDAS REX LEGEND TAPERED SM BORE 1.1MM X 8CM

## (undated) DEVICE — NDL COUNTER FOAM AND MAGNET 40-70

## (undated) DEVICE — GLV 8.5 PROTEXIS (WHITE)

## (undated) DEVICE — SUT NUROLON 4-0 8-18" TF (POP-OFF)

## (undated) DEVICE — Device

## (undated) DEVICE — MIDAS REX LEGEND TAPERED SM BORE 2.3MM X 8CM

## (undated) DEVICE — DRAPE LIGHT HANDLE COVER (BLUE)

## (undated) DEVICE — SUT VICRYL 2-0 18" CP-2 UNDYED (POP-OFF)

## (undated) DEVICE — BLADE SCALPEL SAFETYLOCK #11

## (undated) DEVICE — ELCTR SUBDERMAL CORKSCREW NDL 1.2MM

## (undated) DEVICE — PREP CHLORAPREP HI-LITE ORANGE 26ML

## (undated) DEVICE — DRSG TELFA 3 X 8

## (undated) DEVICE — DRAPE MAYO STAND 30"

## (undated) DEVICE — LUBRICATING JELLY ONESHOT 1.25OZ

## (undated) DEVICE — VISITEC 4X4

## (undated) DEVICE — CANISTER SUCTION 2000CC

## (undated) DEVICE — ELCTR BOVIE PENCIL HANDPIECE

## (undated) DEVICE — BLADE SCALPEL SAFETYLOCK #15

## (undated) DEVICE — CODMAN PERFORATOR 14MM (BLUE)

## (undated) DEVICE — DRAIN JACKSON PRATT 7MM FLAT FULL NO TROCAR

## (undated) DEVICE — MARKING PEN W RULER

## (undated) DEVICE — DRAIN JACKSON PRATT 7FR ROUND END NO TROCAR

## (undated) DEVICE — SUT ETHILON 3-0 18" FS-1

## (undated) DEVICE — SUT VICRYL 3-0 18" X-1 (POP-OFF)

## (undated) DEVICE — POSITIONER FOAM EGG CRATE ULNAR 2PCS (PINK)

## (undated) DEVICE — WARMING BLANKET FULL ADULT

## (undated) DEVICE — FOLEY TRAY 16FR 5CC LTX UMETER CLOSED

## (undated) DEVICE — PREP BETADINE 5% STERILE OPTHALMIC SOLUTION

## (undated) DEVICE — DRSG MASTISOL

## (undated) DEVICE — DRAIN RESERVOIR FOR JACKSON PRATT 100CC CARDINAL

## (undated) DEVICE — ELCTR MONOPOLAR STIMULATOR PROBE FLUSH-TIP

## (undated) DEVICE — LAP PAD 18 X 18"

## (undated) DEVICE — ELCTR PEDICLE SCREW PROBE 3MM BALL 1.8MM X 100MM

## (undated) DEVICE — GLV 7.5 PROTEXIS (WHITE)

## (undated) DEVICE — DRSG STOCKINETTE IMPERVIOUS XL

## (undated) DEVICE — DRAIN JACKSON PRATT 10MM FLAT FULL NO TROCAR

## (undated) DEVICE — BLADE SCALPEL SAFETYLOCK #10

## (undated) DEVICE — PACK VP SHUNT

## (undated) DEVICE — ELCTR SUBDERMAL NDL 27G X 1/2" WITH TWISTED PAIR

## (undated) DEVICE — SOL IRR POUR NS 0.9% 500ML

## (undated) DEVICE — PREP DURAPREP 26CC

## (undated) DEVICE — TUBING BIPOLAR IRRIGATOR AND CORD SET

## (undated) DEVICE — ELCTR BIPOLAR PROBE

## (undated) DEVICE — MEDICATION LABELS W MARKER

## (undated) DEVICE — MIDAS REX LEGEND LUBRICANT DIFFUSER CARTRIDGE

## (undated) DEVICE — GOWN SLEEVES

## (undated) DEVICE — POSITIONER HEAD REST PRONE

## (undated) DEVICE — VENODYNE/SCD SLEEVE CALF LARGE

## (undated) DEVICE — AESCULAP SCALPFIX 10 CLIPS

## (undated) DEVICE — DRAPE TOWEL BLUE 17" X 24"

## (undated) DEVICE — DRSG CURITY GAUZE SPONGE 4 X 4" 12-PLY

## (undated) DEVICE — TUBING CODMAN INTEGRATED BIPOLAR CORD & TUBING SET FLYING LEADS

## (undated) DEVICE — DRAPE 3/4 SHEET W REINFORCEMENT 56X77"

## (undated) DEVICE — ELCTR BOVIE TIP BLADE INSULATED 2.75" EDGE

## (undated) DEVICE — DRAPE INSTRUMENT POUCH 6.75" X 11"

## (undated) DEVICE — GLV 6.5 PROTEXIS (WHITE)

## (undated) DEVICE — ELCTR BIPOLAR CORD J&J 12FT DISP

## (undated) DEVICE — DRSG XEROFORM 1 X 8"

## (undated) DEVICE — VAGINAL PACKING 2 X 6"

## (undated) DEVICE — TAPE SILK 3"

## (undated) DEVICE — BIPOLAR FORCEP SYMMETRY BAYONET 7" X 1.5MM SMOOTH (SILVER)

## (undated) DEVICE — SOL IRR POUR H2O 250ML

## (undated) DEVICE — GOWN TRIMAX LG